# Patient Record
Sex: MALE | Race: WHITE | NOT HISPANIC OR LATINO | Employment: UNEMPLOYED | URBAN - METROPOLITAN AREA
[De-identification: names, ages, dates, MRNs, and addresses within clinical notes are randomized per-mention and may not be internally consistent; named-entity substitution may affect disease eponyms.]

---

## 2022-03-03 NOTE — PROGRESS NOTES
PT Evaluation     Today's date: 3/4/2022  Patient name: Suzanne Bell  : 1965  MRN: [id number]  Referring provider: GOSIA Marte  Dx:   Encounter Diagnosis     ICD-10-CM    1  Cervical radiculopathy  M54 12    2  H/O laminectomy  Z98 890                   Assessment  Assessment details: Chadwick Koehler is a 64year old male presenting to physical therapy with neck pain  Chadwick Koehler shows signs and symptoms suggesting a radiculopathy with high irritability levels  Patient presents with pain, decreased strength, decreased range of motion, postural deficits and decreased tolerance to activity  Due to these impairments patient has difficulty performing activities of daily living, recreational activities and engaging in social activities  Patient would benefit from skilled physical therapy services to address these impairments and to maximize function  Thank you for the referral    Impairments: abnormal muscle firing, abnormal muscle tone, abnormal or restricted ROM, abnormal movement, impaired physical strength, lacks appropriate home exercise program, pain with function and scapular dyskinesis  Understanding of Dx/Px/POC: excellent  Goals  Impairment Goals:  1 ) Pt will have decreased sx at rest by 50% in 2-4 weeks  2 ) Pt will have improved cervical active range of motion by 15 degrees in sidebend and rotation to the left respectively without pain in 4-6 weeks  3 ) Pt will have decreased tenderness to palpation to left upper trapezius by 50% in 3-4 weeks  4 ) Pt will improved left shoulder strength and  strength by 1/2 MMT and 10 lbs without pain in 4-6 weeks  Functional Goals:  1 ) Pt will be independent in their home exercise program in 1 week  2 ) Pt will be able to turn head in car to see blind spot in 4-6 weeks  3 ) Pt will be able to complete all ADL's without neck pain in 6-8 weeks  4 ) Pt will have an improved FOTO score of 75/100 in 6-8 weeks        Plan  Plan details: Chadwick Koehler was also encouraged to talk to high PCP later today about his recent weight changes as well as his left sided rib cage pain  Patient would benefit from: skilled PT  Planned therapy interventions: joint mobilization, manual therapy, neuromuscular re-education, patient education, postural training, strengthening, therapeutic exercise, home exercise program, graded activity and graded exercise  Frequency: 2x week  Duration in weeks: 8  Plan of Care beginning date: 3/4/2022  Plan of Care expiration date: 4/29/2022  Treatment plan discussed with: patient        Subjective Evaluation    History of Present Illness  Mechanism of injury: Jennifer Payne is a 64year old male presenting to physical therapy with neck pain  He reports a surgical hx consisting of C3-6 posterior laminectomy on 7/16/2020 for cervical spondylosis with significant stenosis  He had physical therapy after this procedure but admits it was cut short due to an unrelated right hand injury  Over the last few weeks his left sided neck pain has been worsening  Overall he reports that it is better since surgery but he feels that he it might be coming back since he didn't finish with physical therapy  He feels pain located at left sided base of his neck noted as achy and sore at rest  At times he can get sharp and jolting pain depending on his activity or movement  At its worst he gets burning between shoulder blades and midline at the base of his neck  He does not want to have a second surgery and wishes to control the pain with physical therapy  He explains that his left lesser two fingers have continued to be tingling, this was much more intense prior to surgery  He is left hand dominant but his left arm feels weak  He also comments on a left sided rib cage pain which has been present for a few months  He explains that a lot of his providers have not addressed this issue  He reports that this pain has no precipitating factors and can be random  It is tender to the touch   He denies any specific mechanism which started it  He does note that he is a current smoker with a family hx of color and lung CA  He has COPD  He also mentions he lost 35 pounds in 3 weeks, he is seeing his primary care doctor later today as he is suspecting his hypothyroidism is impacting this weight loss  Aggs: Holding something in left hand, looking up, overhead work, sleeping  Eases: lying supine in bed, ibuprofen, gabanpentin      Pain  Current pain ratin  At best pain ratin  At worst pain rating: 10  Quality: dull ache, sharp and tight  Relieving factors: rest, change in position and medications  Aggravating factors: overhead activity    Treatments  Previous treatment: physical therapy  Patient Goals  Patient goals for therapy: decreased pain, increased motion, increased strength and return to sport/leisure activities          Objective     Concurrent Complaints  Positive for disturbed sleep and headaches (3x/week)  Negative for night pain, dizziness, faints, nausea/motion sickness, trouble swallowing and shortness of breath    Static Posture     Head  Forward  Shoulders  Rounded  Palpation   Left   Tenderness of the cervical paraspinals, latissimus, levator scapulae, lower trapezius, middle trapezius, pectoralis minor, rhomboids, scalenes, sternocleidomastoid, suboccipitals and upper trapezius  Right   Tenderness of the rhomboids, scalenes, sternocleidomastoid, suboccipitals and upper trapezius  Active Range of Motion   Cervical/Thoracic Spine       Cervical    Flexion: Neck active flexion: Stretching and relief    Restriction level: minimal  Extension:  with pain Restriction level: maximal  Left lateral flexion: 15 degrees     with pain  Right lateral flexion: 25 degrees     with pain  Left rotation: 50 degrees with pain  Right rotation: 60 degrees    with pain  Left Shoulder   Flexion: 125 degrees with pain  Abduction: 120 degrees with pain    Additional Active Range of Motion Details  Forward head position needed to be corrected during active range of motion assessment  Educated and treatment provided to correct posture in standing and sitting  Pain reproduced at neck during active range of motion of shoulder      Joint Play     Comments: Did not assess due to discomfort getting into position and pain during palpation    Strength/Myotome Testing   Cervical Spine     Left   Interossei strength (t1): 4    Right   Interossei strength (t1): 4 and 4+    Left Shoulder     Planes of Motion   Flexion: 4 (neck pain)   Abduction: 4 (neck pain)   External rotation at 0°: 4   Internal rotation at 0°: 4     Right Shoulder     Planes of Motion   Flexion: 4+   Abduction: 4+   External rotation at 0°: 4+   Internal rotation at 0°: 4+     Left Elbow   Flexion: 4+  Extension: 4+    Right Elbow   Flexion: 4+  Extension: 4+    Left Wrist/Hand   Wrist extension: 4  Wrist flexion: 4  Radial deviation: 4  Ulnar deviation: 4     (2nd hand position)     Trial 1: 85    Right Wrist/Hand   Wrist extension: 4+  Wrist flexion: 4+  Radial deviation: 4+  Ulnar deviation: 4+     (2nd hand position)     Trial 1: 100    Tests   Cervical   Positive vertical compression and cervical distraction test   Negative VBI  Left   Positive Spurling's Test A  Left Shoulder   Positive ULTT1  Lumbar   Positive vertical compression   Additional Tests Details  No relief noted with supine distraction  Irritability levels remained high during evaluation which likely lead to false positive during special testing    Pain in neck before upper limb tension testing position could be reached  Neuro Exam:     Headaches   Patient reports headaches: Yes (3x/week)                Precautions: COPD, CPAP, Familial Hx CA      Manuals                                                                 Neuro Re-Ed                                                                                                        Ther Ex Seated cervical flexion 10x10''            UT/Lev s 10x10''            Supine chin tuck 10x10''            Shoulder blade squeezes 10x10''                                                                Ther Activity                                       Gait Training                                       Modalities

## 2022-03-04 ENCOUNTER — EVALUATION (OUTPATIENT)
Dept: PHYSICAL THERAPY | Facility: CLINIC | Age: 57
End: 2022-03-04
Payer: COMMERCIAL

## 2022-03-04 DIAGNOSIS — Z98.890 H/O LAMINECTOMY: ICD-10-CM

## 2022-03-04 DIAGNOSIS — M54.12 CERVICAL RADICULOPATHY: Primary | ICD-10-CM

## 2022-03-04 PROCEDURE — 97161 PT EVAL LOW COMPLEX 20 MIN: CPT | Performed by: PHYSICAL THERAPIST

## 2022-03-04 PROCEDURE — 97110 THERAPEUTIC EXERCISES: CPT | Performed by: PHYSICAL THERAPIST

## 2022-03-04 RX ORDER — GABAPENTIN 800 MG/1
800 TABLET ORAL 3 TIMES DAILY
COMMUNITY

## 2022-03-04 RX ORDER — LEVOTHYROXINE SODIUM 0.12 MG/1
125 TABLET ORAL DAILY
COMMUNITY

## 2022-03-04 NOTE — LETTER
2022    Camille Carcamo [facility] [address]    Patient: Waleska Mccrary   YOB: 1965   Date of Visit: 3/4/2022     Encounter Diagnosis     ICD-10-CM    1  Cervical radiculopathy  M54 12    2  H/O laminectomy  Z98 890        Dear Dr Charles Canales:    Thank you for your recent referral of Waleska Mccrary  Please review the attached evaluation summary from Armin's recent visit  Please verify that you agree with the plan of care by signing the attached order  If you have any questions or concerns, please do not hesitate to call  I sincerely appreciate the opportunity to share in the care of one of your patients and hope to have another opportunity to work with you in the near future  Sincerely,    Joselyn Diallo, PT      Referring Provider:      I certify that I have read the below Plan of Care and certify the need for these services furnished under this plan of treatment while under my care  Camille Kil, [address] [place] [id number]  Via Fax: [phone]          PT Evaluation     Today's date: 3/4/2022  Patient name: Waleska Mccrary  : 1965  MRN: [id number]  Referring provider: GOSIA Nieto  Dx:   Encounter Diagnosis     ICD-10-CM    1  Cervical radiculopathy  M54 12    2  H/O laminectomy  Z98 890                   Assessment  Assessment details: David Nava is a 64year old male presenting to physical therapy with neck pain  David Nava shows signs and symptoms suggesting a radiculopathy with high irritability levels  Patient presents with pain, decreased strength, decreased range of motion, postural deficits and decreased tolerance to activity  Due to these impairments patient has difficulty performing activities of daily living, recreational activities and engaging in social activities  Patient would benefit from skilled physical therapy services to address these impairments and to maximize function   Thank you for the referral    Impairments: abnormal muscle firing, abnormal muscle tone, abnormal or restricted ROM, abnormal movement, impaired physical strength, lacks appropriate home exercise program, pain with function and scapular dyskinesis  Understanding of Dx/Px/POC: excellent  Goals  Impairment Goals:  1 ) Pt will have decreased sx at rest by 50% in 2-4 weeks  2 ) Pt will have improved cervical active range of motion by 15 degrees in sidebend and rotation to the left respectively without pain in 4-6 weeks  3 ) Pt will have decreased tenderness to palpation to left upper trapezius by 50% in 3-4 weeks  4 ) Pt will improved left shoulder strength and  strength by 1/2 MMT and 10 lbs without pain in 4-6 weeks  Functional Goals:  1 ) Pt will be independent in their home exercise program in 1 week  2 ) Pt will be able to turn head in car to see blind spot in 4-6 weeks  3 ) Pt will be able to complete all ADL's without neck pain in 6-8 weeks  4 ) Pt will have an improved FOTO score of 75/100 in 6-8 weeks  Plan  Plan details: Tanisha Stewart was also encouraged to talk to high PCP later today about his recent weight changes as well as his left sided rib cage pain  Patient would benefit from: skilled PT  Planned therapy interventions: joint mobilization, manual therapy, neuromuscular re-education, patient education, postural training, strengthening, therapeutic exercise, home exercise program, graded activity and graded exercise  Frequency: 2x week  Duration in weeks: 8  Plan of Care beginning date: 3/4/2022  Plan of Care expiration date: 4/29/2022  Treatment plan discussed with: patient        Subjective Evaluation    History of Present Illness  Mechanism of injury: Tanisha Stewart is a 64year old male presenting to physical therapy with neck pain  He reports a surgical hx consisting of C3-6 posterior laminectomy on 7/16/2020 for cervical spondylosis with significant stenosis   He had physical therapy after this procedure but admits it was cut short due to an unrelated right hand injury  Over the last few weeks his left sided neck pain has been worsening  Overall he reports that it is better since surgery but he feels that he it might be coming back since he didn't finish with physical therapy  He feels pain located at left sided base of his neck noted as achy and sore at rest  At times he can get sharp and jolting pain depending on his activity or movement  At its worst he gets burning between shoulder blades and midline at the base of his neck  He does not want to have a second surgery and wishes to control the pain with physical therapy  He explains that his left lesser two fingers have continued to be tingling, this was much more intense prior to surgery  He is left hand dominant but his left arm feels weak  He also comments on a left sided rib cage pain which has been present for a few months  He explains that a lot of his providers have not addressed this issue  He reports that this pain has no precipitating factors and can be random  It is tender to the touch  He denies any specific mechanism which started it  He does note that he is a current smoker with a family hx of color and lung CA  He has COPD  He also mentions he lost 35 pounds in 3 weeks, he is seeing his primary care doctor later today as he is suspecting his hypothyroidism is impacting this weight loss      Aggs: Holding something in left hand, looking up, overhead work, sleeping  Eases: lying supine in bed, ibuprofen, gabanpentin      Pain  Current pain ratin  At best pain ratin  At worst pain rating: 10  Quality: dull ache, sharp and tight  Relieving factors: rest, change in position and medications  Aggravating factors: overhead activity    Treatments  Previous treatment: physical therapy  Patient Goals  Patient goals for therapy: decreased pain, increased motion, increased strength and return to sport/leisure activities          Objective Concurrent Complaints  Positive for disturbed sleep and headaches (3x/week)  Negative for night pain, dizziness, faints, nausea/motion sickness, trouble swallowing and shortness of breath    Static Posture     Head  Forward  Shoulders  Rounded  Palpation   Left   Tenderness of the cervical paraspinals, latissimus, levator scapulae, lower trapezius, middle trapezius, pectoralis minor, rhomboids, scalenes, sternocleidomastoid, suboccipitals and upper trapezius  Right   Tenderness of the rhomboids, scalenes, sternocleidomastoid, suboccipitals and upper trapezius  Active Range of Motion   Cervical/Thoracic Spine       Cervical    Flexion: Neck active flexion: Stretching and relief  Restriction level: minimal  Extension:  with pain Restriction level: maximal  Left lateral flexion: 15 degrees     with pain  Right lateral flexion: 25 degrees     with pain  Left rotation: 50 degrees with pain  Right rotation: 60 degrees    with pain  Left Shoulder   Flexion: 125 degrees with pain  Abduction: 120 degrees with pain    Additional Active Range of Motion Details  Forward head position needed to be corrected during active range of motion assessment  Educated and treatment provided to correct posture in standing and sitting    Pain reproduced at neck during active range of motion of shoulder      Joint Play     Comments: Did not assess due to discomfort getting into position and pain during palpation    Strength/Myotome Testing   Cervical Spine     Left   Interossei strength (t1): 4    Right   Interossei strength (t1): 4 and 4+    Left Shoulder     Planes of Motion   Flexion: 4 (neck pain)   Abduction: 4 (neck pain)   External rotation at 0°: 4   Internal rotation at 0°: 4     Right Shoulder     Planes of Motion   Flexion: 4+   Abduction: 4+   External rotation at 0°: 4+   Internal rotation at 0°: 4+     Left Elbow   Flexion: 4+  Extension: 4+    Right Elbow   Flexion: 4+  Extension: 4+    Left Wrist/Hand Wrist extension: 4  Wrist flexion: 4  Radial deviation: 4  Ulnar deviation: 4     (2nd hand position)     Trial 1: 85    Right Wrist/Hand   Wrist extension: 4+  Wrist flexion: 4+  Radial deviation: 4+  Ulnar deviation: 4+     (2nd hand position)     Trial 1: 100    Tests   Cervical   Positive vertical compression and cervical distraction test   Negative VBI  Left   Positive Spurling's Test A  Left Shoulder   Positive ULTT1  Lumbar   Positive vertical compression   Additional Tests Details  No relief noted with supine distraction  Irritability levels remained high during evaluation which likely lead to false positive during special testing    Pain in neck before upper limb tension testing position could be reached  Neuro Exam:     Headaches   Patient reports headaches: Yes (3x/week)                Precautions: COPD, CPAP, Familial Hx CA      Manuals                                                                 Neuro Re-Ed                                                                                                        Ther Ex             Seated cervical flexion 10x10''            UT/Lev s 10x10''            Supine chin tuck 10x10''            Shoulder blade squeezes 10x10''                                                                Ther Activity                                       Gait Training                                       Modalities

## 2022-03-10 ENCOUNTER — OFFICE VISIT (OUTPATIENT)
Dept: PHYSICAL THERAPY | Facility: CLINIC | Age: 57
End: 2022-03-10
Payer: COMMERCIAL

## 2022-03-10 DIAGNOSIS — M54.12 CERVICAL RADICULOPATHY: Primary | ICD-10-CM

## 2022-03-10 DIAGNOSIS — M25.551 RIGHT HIP PAIN: ICD-10-CM

## 2022-03-10 PROCEDURE — 97110 THERAPEUTIC EXERCISES: CPT | Performed by: PHYSICAL THERAPIST

## 2022-03-10 PROCEDURE — 97140 MANUAL THERAPY 1/> REGIONS: CPT | Performed by: PHYSICAL THERAPIST

## 2022-03-10 NOTE — PROGRESS NOTES
Daily Note     Today's date: 3/10/2022  Patient name: Tamir Basurto  : 1965  MRN: [id number]  Referring provider: GOSIA Aguayo  Dx:   Encounter Diagnosis     ICD-10-CM    1  Cervical radiculopathy  M54 12    2  Right hip pain  M25 551                   Subjective: [place] explains that his neck has been feeling a little better  He has some questions about his seated shoulder blade squeezes and his chin tucks  Objective: See treatment diary below      Assessment: Tolerated treatment well  Patient demonstrated fatigue post treatment  Cueing to correct form during chin tuck and seated shoulder blade squeezes  He is only to do chin tucks in supine at this time  Continue manuals and cervical distraction nv as this provided relief  Plan: Continue per plan of care        Precautions: COPD, CPAP, Familial Hx CA      Manuals  3/10           SOR  RN           TPR L UT  RN           Supine cervical distraction  RN           ULTT  nv           Neuro Re-Ed                                                                                                        Ther Ex             Seated cervical flexion 10x10'' 10x10''           UT/Lev s 10x10'' 10x10''           Supine chin tuck 10x10'' 10x5''           Shoulder blade squeezes 10x10'' 10x10''           Supine cervical rotation  10x5'' ea                                                  Ther Activity                                       Gait Training                                       Modalities

## 2022-03-14 ENCOUNTER — OFFICE VISIT (OUTPATIENT)
Dept: PHYSICAL THERAPY | Facility: CLINIC | Age: 57
End: 2022-03-14
Payer: COMMERCIAL

## 2022-03-14 DIAGNOSIS — M25.551 RIGHT HIP PAIN: ICD-10-CM

## 2022-03-14 DIAGNOSIS — M54.12 CERVICAL RADICULOPATHY: Primary | ICD-10-CM

## 2022-03-14 DIAGNOSIS — Z98.890 H/O LAMINECTOMY: ICD-10-CM

## 2022-03-14 PROCEDURE — 97140 MANUAL THERAPY 1/> REGIONS: CPT | Performed by: PHYSICAL THERAPIST

## 2022-03-14 NOTE — PROGRESS NOTES
Daily Note     Today's date: 3/14/2022  Patient name: Charley Smith  : 1965  MRN: [id number]  Referring provider: GOSIA Bhatia  Dx:   Encounter Diagnosis     ICD-10-CM    1  Cervical radiculopathy  M54 12    2  Right hip pain  M25 551    3  H/O laminectomy  Z98 890                   Subjective: Edin Nails describes an episode yesterday morning when he was sitting in his car, leaning his right ear towards his right shoulder  He was watching videos on his phone and went into a period where he could only hear the video, feeling stuck in position and he found himself drooling  He is unsure of how long he was in this position, feels "off" since that point  He denies chest pain, slurred speech, nausea, dizziness  He denies weakness today  Objective: See treatment diary below      Assessment: Tolerated treatment well  Patient demonstrated fatigue post treatment and exhibited good technique with therapeutic exercises  Edin Nails was encouraged to follow up with his PCP or neurologist given his recent symptoms  Held on progressing TE at this time, also encouraged him to avoid irritating his sx and keep them to a minimum until we clear his symptoms up with his PCP  Plan: Continue per plan of care        Precautions: COPD, CPAP, Familial Hx CA      Manuals  3/10 3/14          SOR  RN FERNANDO          TPR L UT  RN RN          Supine cervical distraction  FERNANDO barber           Neuro Re-Ed                                                                                                        Ther Ex             Seated cervical flexion 10x10'' 10x10'' 10x10''          UT/Lev s 10x10'' 10x10''           Supine chin tuck 10x10'' 10x5''           Shoulder blade squeezes 10x10'' 10x10''           Supine cervical rotation  10x5'' ea                                                  Ther Activity                                       Gait Training                                       Modalities

## 2022-03-17 ENCOUNTER — APPOINTMENT (OUTPATIENT)
Dept: PHYSICAL THERAPY | Facility: CLINIC | Age: 57
End: 2022-03-17
Payer: COMMERCIAL

## 2022-03-21 ENCOUNTER — OFFICE VISIT (OUTPATIENT)
Dept: PHYSICAL THERAPY | Facility: CLINIC | Age: 57
End: 2022-03-21
Payer: COMMERCIAL

## 2022-03-21 DIAGNOSIS — M54.12 CERVICAL RADICULOPATHY: Primary | ICD-10-CM

## 2022-03-21 DIAGNOSIS — M25.551 RIGHT HIP PAIN: ICD-10-CM

## 2022-03-21 DIAGNOSIS — Z98.890 H/O LAMINECTOMY: ICD-10-CM

## 2022-03-21 PROCEDURE — 97140 MANUAL THERAPY 1/> REGIONS: CPT | Performed by: PHYSICAL THERAPIST

## 2022-03-21 NOTE — PROGRESS NOTES
Daily Note     Today's date: 3/21/2022  Patient name: Radha Conte  : 1965  MRN: [id number]  Referring provider: GOSIA Jorgensen  Dx:   Encounter Diagnosis     ICD-10-CM    1  Cervical radiculopathy  M54 12    2  Right hip pain  M25 551    3  H/O laminectomy  Z98 890                   Subjective: [facility] explains that neck continues to bother him, he is going to see his neurologist on Wednesday and he is convinced he will need another surgery  Objective: See treatment diary below      Assessment: Tolerated treatment well  Patient demonstrated fatigue post treatment  Alex notes relief with seated cervical flexion and right side bend, opening up on the left  He was told to stop completing the chin tucks and other irritating motions all together  Soft tissue mobilization to left upper trapezius provided today, did not do any TE due to symptoms irritability and changes  Plan: Continue per plan of care        Precautions: COPD, CPAP, Familial Hx CA      Manuals  3/10 3/14 3/21         SOR  RN RN          TPR L UT  RN RN RN         Supine cervical distraction  RN FERNANDO barber           Neuro Re-Ed                                                                                                        Ther Ex             Seated cervical flexion 10x10'' 10x10'' 10x10''          UT/Lev s 10x10'' 10x10''           Supine chin tuck 10x10'' 10x5''           Shoulder blade squeezes 10x10'' 10x10''           Supine cervical rotation  10x5'' ea                                                  Ther Activity                                       Gait Training                                       Modalities

## 2022-03-24 ENCOUNTER — APPOINTMENT (OUTPATIENT)
Dept: PHYSICAL THERAPY | Facility: CLINIC | Age: 57
End: 2022-03-24
Payer: COMMERCIAL

## 2022-03-28 ENCOUNTER — OFFICE VISIT (OUTPATIENT)
Dept: PHYSICAL THERAPY | Facility: CLINIC | Age: 57
End: 2022-03-28
Payer: COMMERCIAL

## 2022-03-28 DIAGNOSIS — Z98.890 H/O LAMINECTOMY: ICD-10-CM

## 2022-03-28 DIAGNOSIS — M25.551 RIGHT HIP PAIN: ICD-10-CM

## 2022-03-28 DIAGNOSIS — M54.12 CERVICAL RADICULOPATHY: Primary | ICD-10-CM

## 2022-03-28 PROCEDURE — 97140 MANUAL THERAPY 1/> REGIONS: CPT | Performed by: PHYSICAL THERAPIST

## 2022-03-31 ENCOUNTER — APPOINTMENT (OUTPATIENT)
Dept: PHYSICAL THERAPY | Facility: CLINIC | Age: 57
End: 2022-03-31
Payer: COMMERCIAL

## 2022-04-04 ENCOUNTER — OFFICE VISIT (OUTPATIENT)
Dept: PHYSICAL THERAPY | Facility: CLINIC | Age: 57
End: 2022-04-04
Payer: COMMERCIAL

## 2022-04-04 DIAGNOSIS — M54.12 CERVICAL RADICULOPATHY: Primary | ICD-10-CM

## 2022-04-04 DIAGNOSIS — M25.551 RIGHT HIP PAIN: ICD-10-CM

## 2022-04-04 DIAGNOSIS — Z98.890 H/O LAMINECTOMY: ICD-10-CM

## 2022-04-04 PROCEDURE — 97140 MANUAL THERAPY 1/> REGIONS: CPT | Performed by: PHYSICAL THERAPIST

## 2022-04-04 NOTE — PROGRESS NOTES
Daily Note     Today's date: 2022  Patient name: Sindy Cantrell  : 1965  MRN: [id number]  Referring provider: GOSIA Weiss  Dx:   Encounter Diagnosis     ICD-10-CM    1  Cervical radiculopathy  M54 12    2  Right hip pain  M25 551    3  H/O laminectomy  Z98 890                   Subjective: Stoney Gibson explains that he has had a left sided neck pain and a headache behind his eye for 3 days  Objective: See treatment diary below      Assessment: Tolerated treatment well  Patient demonstrated fatigue post treatment  After completion of manuals, headache and eye pain reduced to 0/10  Encouraged Alex to lay in a non weight bearing position to give his neck a rest during the times where his headache is uncontrolled  Plan: Continue per plan of care        Precautions: COPD, CPAP, Familial Hx CA      Manuals  3/10 3/14 3/21 4/        SOR  RN RN RN RN        TPR L UT  RN RN RN RN        Supine cervical distraction  RN RN  RN        DANNIE barber           Neuro Re-Ed                                                                                                        Ther Ex             Seated cervical flexion 10x10'' 10x10'' 10x10'' 10x10'' 10x10''        UT/Lev s 10x10'' 10x10''  UT only 10x10'' UT only 10x10''        Supine chin tuck 10x10'' 10x5''           Shoulder blade squeezes 10x10'' 10x10''           Supine cervical rotation  10x5'' ea                                                  Ther Activity                                       Gait Training                                       Modalities

## 2022-04-07 ENCOUNTER — OFFICE VISIT (OUTPATIENT)
Dept: PHYSICAL THERAPY | Facility: CLINIC | Age: 57
End: 2022-04-07
Payer: COMMERCIAL

## 2022-04-07 DIAGNOSIS — M54.12 CERVICAL RADICULOPATHY: Primary | ICD-10-CM

## 2022-04-07 DIAGNOSIS — M25.551 RIGHT HIP PAIN: ICD-10-CM

## 2022-04-07 DIAGNOSIS — Z98.890 H/O LAMINECTOMY: ICD-10-CM

## 2022-04-07 PROCEDURE — 97140 MANUAL THERAPY 1/> REGIONS: CPT | Performed by: PHYSICAL THERAPIST

## 2022-04-07 PROCEDURE — 97110 THERAPEUTIC EXERCISES: CPT | Performed by: PHYSICAL THERAPIST

## 2022-04-07 NOTE — PROGRESS NOTES
Daily Note     Today's date: 2022  Patient name: Yasmine Desir  : 1965  MRN: [id number]  Referring provider: Marylee Krauss, CRNP  Dx:   Encounter Diagnosis     ICD-10-CM    1  Cervical radiculopathy  M54 12    2  Right hip pain  M25 551    3  H/O laminectomy  Z98 890                   Subjective: Rajeev Whitfield explains that for some reason he is having a great day, he is unsure of why  Objective: See treatment diary below      Assessment: Tolerated treatment well  Patient demonstrated fatigue post treatment and exhibited good technique with therapeutic exercises  Improved soft tissue mobility to cervical spine, slightly improved range of motion during active stretches  Plan: Continue per plan of care        Precautions: COPD, CPAP, Familial Hx CA      Manuals  3/10 3/14 3/21 4/4 4/7       SOR  RN RN RN RN RN       TPR L UT  RN RN RN RN RN       Supine cervical distraction  RN RN  RN RN       DANNIE barber           Neuro Re-Ed                                                                                                        Ther Ex             Seated cervical flexion 10x10'' 10x10'' 10x10'' 10x10'' 10x10'' 10x10''       UT/Lev s 10x10'' 10x10''  UT only 10x10'' UT only 10x10'' UT only 10x10''       Supine chin tuck 10x10'' 10x5''           Shoulder blade squeezes 10x10'' 10x10''           Supine cervical rotation  10x5'' ea                                                  Ther Activity                                       Gait Training                                       Modalities

## 2022-04-12 ENCOUNTER — OFFICE VISIT (OUTPATIENT)
Dept: PHYSICAL THERAPY | Facility: CLINIC | Age: 57
End: 2022-04-12
Payer: COMMERCIAL

## 2022-04-12 DIAGNOSIS — M25.551 RIGHT HIP PAIN: ICD-10-CM

## 2022-04-12 DIAGNOSIS — M54.12 CERVICAL RADICULOPATHY: Primary | ICD-10-CM

## 2022-04-12 DIAGNOSIS — Z98.890 H/O LAMINECTOMY: ICD-10-CM

## 2022-04-12 PROCEDURE — 97110 THERAPEUTIC EXERCISES: CPT | Performed by: PHYSICAL THERAPIST

## 2022-04-12 PROCEDURE — 97140 MANUAL THERAPY 1/> REGIONS: CPT | Performed by: PHYSICAL THERAPIST

## 2022-04-12 NOTE — PROGRESS NOTES
Daily Note     Today's date: 2022  Patient name: Kasandra Fong  : 1965  MRN: [id number]  Referring provider: GOSIA Hanley  Dx:   Encounter Diagnosis     ICD-10-CM    1  Cervical radiculopathy  M54 12    2  Right hip pain  M25 551    3  H/O laminectomy  Z98 890                   Subjective: Theodore Del Castillo explains that yesterday he felt great, this morning he has a left sided frontal headache  He thinks he may have slept the wrong way  Objective: See treatment diary below      Assessment: Tolerated treatment well  Patient demonstrated fatigue post treatment and exhibited good technique with therapeutic exercises  Subjective relief noted with muscular tension and headaches this morning post sub occipital release and soft tissue mobilizations  Plan: Continue per plan of care        Precautions: COPD, CPAP, Familial Hx CA      Manuals  3/10 3/14 3/21 4/4 4/7 4/12      SOR  RN RN RN RN RN RN      TPR L UT  RN RN RN RN RN RN      Supine cervical distraction  RN RN  RN RN RN      DANNIE barber           Neuro Re-Ed                                                                                                        Ther Ex             Seated cervical flexion 10x10'' 10x10'' 10x10'' 10x10'' 10x10'' 10x10'' 10x10''      UT/Lev s 10x10'' 10x10''  UT only 10x10'' UT only 10x10'' UT only 10x10'' UT only 10x10''      Supine chin tuck 10x10'' 10x5''           Shoulder blade squeezes 10x10'' 10x10''           Supine cervical rotation  10x5'' ea                                                  Ther Activity                                       Gait Training                                       Modalities

## 2022-04-19 ENCOUNTER — HOSPITAL ENCOUNTER (EMERGENCY)
Facility: HOSPITAL | Age: 57
Discharge: HOME | End: 2022-04-19
Attending: EMERGENCY MEDICINE | Admitting: EMERGENCY MEDICINE
Payer: COMMERCIAL

## 2022-04-19 VITALS
WEIGHT: 277 LBS | SYSTOLIC BLOOD PRESSURE: 129 MMHG | RESPIRATION RATE: 18 BRPM | HEART RATE: 77 BPM | TEMPERATURE: 98.7 F | HEIGHT: 74 IN | DIASTOLIC BLOOD PRESSURE: 87 MMHG | BODY MASS INDEX: 35.55 KG/M2 | OXYGEN SATURATION: 96 %

## 2022-04-19 DIAGNOSIS — R51.9 RECURRENT OCCIPITAL HEADACHE: ICD-10-CM

## 2022-04-19 DIAGNOSIS — M54.12 CERVICAL RADICULOPATHY: Primary | ICD-10-CM

## 2022-04-19 DIAGNOSIS — S16.1XXA STRAIN OF CERVICAL PORTION OF LEFT TRAPEZIUS MUSCLE: ICD-10-CM

## 2022-04-19 PROCEDURE — 64405 NJX AA&/STRD GR OCPL NRV: CPT | Performed by: EMERGENCY MEDICINE

## 2022-04-19 PROCEDURE — 99283 EMERGENCY DEPT VISIT LOW MDM: CPT

## 2022-04-19 PROCEDURE — 99284 EMERGENCY DEPT VISIT MOD MDM: CPT | Performed by: EMERGENCY MEDICINE

## 2022-04-19 RX ORDER — METHOCARBAMOL 500 MG/1
1000 TABLET, FILM COATED ORAL 3 TIMES DAILY PRN
Qty: 40 TABLET | Refills: 0 | Status: SHIPPED | OUTPATIENT
Start: 2022-04-19

## 2022-04-19 RX ORDER — METHOCARBAMOL 500 MG/1
500 TABLET, FILM COATED ORAL ONCE
Status: COMPLETED | OUTPATIENT
Start: 2022-04-19 | End: 2022-04-19

## 2022-04-19 RX ORDER — LIDOCAINE HYDROCHLORIDE 20 MG/ML
5 INJECTION, SOLUTION EPIDURAL; INFILTRATION; INTRACAUDAL; PERINEURAL ONCE
Status: COMPLETED | OUTPATIENT
Start: 2022-04-19 | End: 2022-04-19

## 2022-04-19 RX ADMIN — METHOCARBAMOL 500 MG: 500 TABLET ORAL at 20:13

## 2022-04-19 RX ADMIN — LIDOCAINE HYDROCHLORIDE 5 ML: 20 INJECTION, SOLUTION EPIDURAL; INFILTRATION; INTRACAUDAL; PERINEURAL at 20:13

## 2022-04-19 NOTE — ED PROVIDER NOTES
History  Chief Complaint   Patient presents with    Neck Pain     Patient presents to the ER with head and neck pain  Patient had spinal surgery ~ 2 years ago and has been having "strange symptoms" since  59-year-old male presents for evaluation of left-sided neck pain with intermittent radiating sharp pain from the occipital region of the left side of his head to his left temple  The patient states that the symptoms are usually exacerbated with straining or movement of his neck exacerbating symptoms usually last a 1/2 hour to several hours  He has occasional radiating pain into his left hand  The patient has been seen by Neurology and is currently undergoing physical therapy for similar symptoms  He is takin gabapentin TID  cymbalta QHs  Plus one other medication he cannot recall  Patient denies taking muscle relaxer  The patient denies any recent trauma or illness            Prior to Admission Medications   Prescriptions Last Dose Informant Patient Reported? Taking?   gabapentin (NEURONTIN) 800 mg tablet 2022 at Unknown time  Yes Yes   Sig: Take 800 mg by mouth 3 (three) times a day   levothyroxine 125 mcg tablet 2022 at Unknown time  Yes Yes   Sig: Take 125 mcg by mouth daily      Facility-Administered Medications: None       Past Medical History:   Diagnosis Date    H/O laminectomy     Posterior laminectomy C3-6    Hypothyroidism        Past Surgical History:   Procedure Laterality Date    ELBOW SURGERY Right     HAND TENDON SURGERY         History reviewed  No pertinent family history  I have reviewed and agree with the history as documented      E-Cigarette/Vaping    E-Cigarette Use Never User      E-Cigarette/Vaping Substances     Social History     Tobacco Use    Smoking status: Current Every Day Smoker     Packs/day: 1 00    Smokeless tobacco: Never Used   Vaping Use    Vaping Use: Never used   Substance Use Topics    Alcohol use: Not Currently    Drug use: Never Review of Systems   Constitutional: Negative for chills and fever  Gastrointestinal: Positive for nausea  Musculoskeletal: Positive for neck pain and neck stiffness  Neurological: Positive for dizziness and headaches  All other systems reviewed and are negative  Physical Exam  Physical Exam  Vitals and nursing note reviewed  Constitutional:       General: He is not in acute distress  Appearance: He is well-developed  HENT:      Head: Normocephalic and atraumatic  Right Ear: External ear normal       Left Ear: External ear normal       Mouth/Throat:      Pharynx: No oropharyngeal exudate  Eyes:      General: No scleral icterus  Pupils: Pupils are equal, round, and reactive to light  Cardiovascular:      Rate and Rhythm: Normal rate and regular rhythm  Heart sounds: Normal heart sounds  Pulmonary:      Effort: Pulmonary effort is normal  No respiratory distress  Breath sounds: Normal breath sounds  Abdominal:      General: Bowel sounds are normal       Palpations: Abdomen is soft  Tenderness: There is no abdominal tenderness  There is no guarding or rebound  Musculoskeletal:      Cervical back: Neck supple  No rigidity or torticollis  Pain with movement and muscular tenderness present  No spinous process tenderness  Decreased range of motion  Lymphadenopathy:      Cervical:      Right cervical: No posterior cervical adenopathy  Left cervical: No posterior cervical adenopathy  Skin:     General: Skin is warm and dry  Findings: No rash  Neurological:      Mental Status: He is alert and oriented to person, place, and time           Vital Signs  ED Triage Vitals [04/19/22 1710]   Temperature Pulse Respirations Blood Pressure SpO2   98 7 °F (37 1 °C) 89 18 128/77 97 %      Temp Source Heart Rate Source Patient Position - Orthostatic VS BP Location FiO2 (%)   Oral Monitor Sitting Left arm --      Pain Score       8           Vitals:    04/19/22 1710 04/19/22 2016   BP: 128/77 129/87   Pulse: 89 77   Patient Position - Orthostatic VS: Sitting Sitting         Visual Acuity      ED Medications  Medications   lidocaine (PF) (XYLOCAINE-MPF) 2 % injection 5 mL (5 mL Infiltration Given by Other 4/19/22 2013)   methocarbamol (ROBAXIN) tablet 500 mg (500 mg Oral Given 4/19/22 2013)       Diagnostic Studies  Results Reviewed     None                 No orders to display              Procedures  Nerve block    Date/Time: 4/19/2022 8:37 PM  Performed by: Marilee Bryan DO  Authorized by: Marilee Bryan DO     Patient location:  ED  [place] Protocol:  Consent: Verbal consent obtained  Risks and benefits: risks, benefits and alternatives were discussed    Indications:     Indications:  Pain relief  Location:     Body area:  Head    Head nerve:  Greater occipital    Laterality:  Left  Pre-procedure details:     Skin preparation:  2% chlorhexidine  Procedure details (see MAR for exact dosages): Block needle gauge:  27 G    Anesthetic injected:  Lidocaine 2% w/o epi    Injection procedure:  Anatomic landmarks identified, anatomic landmarks palpated, incremental injection, introduced needle and negative aspiration for blood  Post-procedure details:     Dressing:  None    Outcome:  Pain relieved    Patient tolerance of procedure: Tolerated well, no immediate complications             ED Course                               SBIRT 22yo+      Most Recent Value   SBIRT (22 yo +)    In order to provide better care to our patients, we are screening all of our patients for alcohol and drug use  Would it be okay to ask you these screening questions? Yes Filed at: 04/19/2022 2015   Initial Alcohol Screen: US AUDIT-C     1  How often do you have a drink containing alcohol? 0 Filed at: 04/19/2022 2015   2  How many drinks containing alcohol do you have on a typical day you are drinking? 0 Filed at: 04/19/2022 2015   3a  Male UNDER 65:  How often do you have five or more drinks on one occasion? 0 Filed at: 04/19/2022 2015   Audit-C Score 0 Filed at: 04/19/2022 2015   IZAIAH: How many times in the past year have you    Used an illegal drug or used a prescription medication for non-medical reasons? Never Filed at: 04/19/2022 2015                    [facility]  Number of Diagnoses or Management Options  Cervical radiculopathy  Recurrent occipital headache  Strain of cervical portion of left trapezius muscle  Diagnosis management comments: Patient with history and examination consistent with occipital headache and left cervical radiculopathy  Patient's symptoms are reproducible with movement and certain positions  0 the patient's left-sided headache originates from his occipital ridge to the left retro-orbital region  I discussed the use of an occipital block for pain control  We discussed the cervical spasm in the use of a peripheral acting skeletal muscle relaxer  The patient was advised to continue with physical therapy and outpatient neurology  The patient had relief of pain after the nerve block  The patient also had improved movement of his cervical spine with decreased pain as well prior to discharge  The patient (and any family present) verbalized understanding of the discharge instructions and warnings that would necessitate return to the Emergency Department  All questions were answered prior to discharge         Amount and/or Complexity of Data Reviewed  Review and summarize past medical records: yes (Physical therapy notes reviewed with recent with no cervical radiculopathy treatment)        Disposition  Final diagnoses:   Cervical radiculopathy   Recurrent occipital headache   Strain of cervical portion of left trapezius muscle     Time reflects when diagnosis was documented in both MDM as applicable and the Disposition within this note     Time User Action Codes Description Comment    4/19/2022  8:35 PM Floyd Victoria Add [M54 12] Cervical radiculopathy     4/19/2022  8:36 PM Samina ARIAS Add [R51 9] Recurrent occipital headache     4/19/2022  8:36 PM Bernie Ocasio Add Olvin Brisk  1XXA] Strain of cervical portion of left trapezius muscle       ED Disposition     ED Disposition Condition Date/Time Comment    Discharge Stable Tue Apr 19, 2022  8:35 PM Sindy Pollen discharge to home/self care  Follow-up Information     Follow up With Specialties Details Why Contact Info    Eliot Whitley MD [facility] Medicine Schedule an appointment as soon as possible for a visit  For further evaluation, If symptoms worsen [facility] [address] [place] [address]  [phone]            Patient's Medications   Discharge Prescriptions    METHOCARBAMOL (ROBAXIN) 500 MG TABLET    Take 2 tablets (1,000 mg total) by mouth 3 (three) times a day as needed for muscle spasms       Start Date: 4/19/2022 End Date: --       Order Dose: 1,000 mg       Quantity: 40 tablet    Refills: 0       No discharge procedures on file      PDMP Review       Value Time User    PDMP Reviewed  Yes 4/19/2022  7:58 PM Gavino Richards DO          ED Provider  Electronically Signed by           Gavino Richards DO  04/19/22 2044

## 2022-04-20 ENCOUNTER — OFFICE VISIT (OUTPATIENT)
Dept: PHYSICAL THERAPY | Facility: CLINIC | Age: 57
End: 2022-04-20
Payer: COMMERCIAL

## 2022-04-20 DIAGNOSIS — Z98.890 H/O LAMINECTOMY: ICD-10-CM

## 2022-04-20 DIAGNOSIS — M54.12 CERVICAL RADICULOPATHY: Primary | ICD-10-CM

## 2022-04-20 DIAGNOSIS — M25.551 RIGHT HIP PAIN: ICD-10-CM

## 2022-04-20 PROCEDURE — 97140 MANUAL THERAPY 1/> REGIONS: CPT | Performed by: PHYSICAL THERAPIST

## 2022-04-20 NOTE — PROGRESS NOTES
Daily Note     Today's date: 2022  Patient name: Winston Mckinley  : 1965  MRN: [id number]  Referring provider: GOSIA Stark  Dx:   Encounter Diagnosis     ICD-10-CM    1  Cervical radiculopathy  M54 12    2  Right hip pain  M25 551    3  H/O laminectomy  Z98 890                   Subjective: Betty Moore explains that his symptoms have not improved much at this point  He went to the ER last night due to sharp and radiating pain into the back of his head  He has a shot of lidocaine which provided temporary relief  Objective: See treatment diary below      Assessment: Tolerated treatment fair  Patient demonstrated fatigue post treatment  Sharp and increased pain when providing trigger point release to right upper trapezius, held on this  Sub-occipital release provided with relief and improvement in sx noted  Plan: Continue per plan of care        Precautions: COPD, CPAP, Familial Hx CA      Manuals  3/10 3/14 3/21 4/4 4/7 4/12 4/20     SOR  RN RN RN RN RN RN RN     TPR L UT  RN RN RN RN RN RN RN     Supine cervical distraction  RN RN  RN RN RN RN     DANNIE barber           Neuro Re-Ed                                                                                                        Ther Ex             Seated cervical flexion 10x10'' 10x10'' 10x10'' 10x10'' 10x10'' 10x10'' 10x10'' 10x10''     UT/Lev s 10x10'' 10x10''  UT only 10x10'' UT only 10x10'' UT only 10x10'' UT only 10x10'' UT only 10x10''     Supine chin tuck 10x10'' 10x5''           Shoulder blade squeezes 10x10'' 10x10''           Supine cervical rotation  10x5'' ea                                                  Ther Activity                                       Gait Training                                       Modalities

## 2022-04-25 ENCOUNTER — OFFICE VISIT (OUTPATIENT)
Dept: PHYSICAL THERAPY | Facility: CLINIC | Age: 57
End: 2022-04-25
Payer: COMMERCIAL

## 2022-04-25 DIAGNOSIS — Z98.890 H/O LAMINECTOMY: ICD-10-CM

## 2022-04-25 DIAGNOSIS — M25.551 RIGHT HIP PAIN: ICD-10-CM

## 2022-04-25 DIAGNOSIS — M54.12 CERVICAL RADICULOPATHY: Primary | ICD-10-CM

## 2022-04-25 PROCEDURE — 97140 MANUAL THERAPY 1/> REGIONS: CPT | Performed by: PHYSICAL THERAPIST

## 2022-04-25 NOTE — PROGRESS NOTES
Daily Note     Today's date: 2022  Patient name: Jack Rain  : 1965  MRN: [id number]  Referring provider: GOSIA Dela Cruz  Dx:   Encounter Diagnosis     ICD-10-CM    1  Cervical radiculopathy  M54 12    2  Right hip pain  M25 551    3  H/O laminectomy  Z98 890                   Subjective: Conrad Lewis reports that his neurologist increased his medications for night time  He explains that his head pains are still painful  Objective: See treatment diary below      Assessment: Tolerated treatment well  Patient demonstrated fatigue post treatment and exhibited good technique with therapeutic exercises  Continued emphasis on manuals this session with limitations in suboccipitals and restrictions in scalenes  Plan: Continue per plan of care        Precautions: COPD, CPAP, Familial Hx CA      Manuals  3/10 3/14 3/21 4/4 4/7 4/12 4/20 4/25    SOR  RN RN RN RN RN RN RN RN    TPR L UT  RN RN RN RN RN RN RN RN    Supine cervical distraction  RN RN  RN RN RN RN RN    DANNIE barber           Neuro Re-Ed                                                                                                        Ther Ex             Seated cervical flexion 10x10'' 10x10'' 10x10'' 10x10'' 10x10'' 10x10'' 10x10'' 10x10'' 10x10''    UT/Lev s 10x10'' 10x10''  UT only 10x10'' UT only 10x10'' UT only 10x10'' UT only 10x10'' UT only 10x10'' UT only 10x10''    Supine chin tuck 10x10'' 10x5''           Shoulder blade squeezes 10x10'' 10x10''           Supine cervical rotation  10x5'' ea                                                  Ther Activity                                       Gait Training                                       Modalities

## 2022-04-29 ENCOUNTER — APPOINTMENT (OUTPATIENT)
Dept: PHYSICAL THERAPY | Facility: CLINIC | Age: 57
End: 2022-04-29
Payer: COMMERCIAL

## 2022-05-04 ENCOUNTER — OFFICE VISIT (OUTPATIENT)
Dept: PHYSICAL THERAPY | Facility: CLINIC | Age: 57
End: 2022-05-04
Payer: COMMERCIAL

## 2022-05-04 DIAGNOSIS — Z98.890 H/O LAMINECTOMY: ICD-10-CM

## 2022-05-04 DIAGNOSIS — M54.12 CERVICAL RADICULOPATHY: Primary | ICD-10-CM

## 2022-05-04 DIAGNOSIS — M25.551 RIGHT HIP PAIN: ICD-10-CM

## 2022-05-04 PROCEDURE — 97140 MANUAL THERAPY 1/> REGIONS: CPT | Performed by: PHYSICAL THERAPIST

## 2022-05-04 PROCEDURE — 97110 THERAPEUTIC EXERCISES: CPT | Performed by: PHYSICAL THERAPIST

## 2022-05-04 NOTE — PROGRESS NOTES
Daily Note     Today's date: 2022  Patient name: Namita Maldonado  : 1965  MRN: [id number]  Referring provider: GOSIA Guallpa  Dx:   Encounter Diagnosis     ICD-10-CM    1  Cervical radiculopathy  M54 12    2  Right hip pain  M25 551    3  H/O laminectomy  Z98 890                   Subjective: Robbi Peralta explains that his headaches and neck pain have improved, he was taking his one medication wrong and this was corrected by his doctor  Objective: See treatment diary below      Assessment: Tolerated treatment well  Patient demonstrated fatigue post treatment and would benefit from continued PT  Improved soft tissue mobility this session as compared to prior  Continued to keep TE low to avoid irritating sx, will follow up nv after his neurology appointment with updated MRI status  Plan: Continue per plan of care        Precautions: COPD, CPAP, Familial Hx CA      Manuals  3/10 3/14 3/21 4/4 4/7 4/12 4/20 4/25 5/4   SOR  RN RN RN RN RN RN RN RN RN   TPR L UT  RN RN RN RN RN RN RN RN RN   Supine cervical distraction  RN RN  RN RN RN RN RN RN   DANNIE  nv           Neuro Re-Ed                                                                                                        Ther Ex             Seated cervical flexion 10x10'' 10x10'' 10x10'' 10x10'' 10x10'' 10x10'' 10x10'' 10x10'' 10x10'' 10x10''   UT/Lev s 10x10'' 10x10''  UT only 10x10'' UT only 10x10'' UT only 10x10'' UT only 10x10'' UT only 10x10'' UT only 10x10'' UT only 10x10''   Supine chin tuck 10x10'' 10x5''           Shoulder blade squeezes 10x10'' 10x10''           Supine cervical rotation  10x5'' ea                                                  Ther Activity                                       Gait Training                                       Modalities

## 2022-09-06 ENCOUNTER — TELEMEDICINE (OUTPATIENT)
Dept: BEHAVIORAL/MENTAL HEALTH CLINIC | Facility: CLINIC | Age: 57
End: 2022-09-06
Payer: COMMERCIAL

## 2022-09-06 DIAGNOSIS — F32.89 OTHER DEPRESSION: ICD-10-CM

## 2022-09-06 DIAGNOSIS — F43.10 POSTTRAUMATIC STRESS DISORDER: Primary | ICD-10-CM

## 2022-09-06 PROCEDURE — 90834 PSYTX W PT 45 MINUTES: CPT

## 2022-09-06 NOTE — PSYCH
Psychotherapy Provided: Individual Psychotherapy 45 minutes     Length of time in session: 45 minutes, follow up in 2 week    Start time:11:00 AM  End time: 11:43 AM    Encounter Diagnosis     ICD-10-CM    1  Posttraumatic stress disorder  F43 10    2  Other depression  F32 89        Goals addressed in session: Goal 1       Current suicide risk : Low     D: Alex shares he is doing well overall  Reports increased sense of hope, increased motivation, improved mood  Cl demonstrated cognitive distortions with future oriented thinking  Clinician engaged cl in CBT modalities to reframe and challenge negative thoughts patterns and interpretations of past memories related to past traumas  A: Cynthia Bowers is alert and engaged  He is cooperative and open  Denies SI/HI  PHQ9 readministered today with a total of 2; decrease of 6 since last screening on 10/2021  P: Meet in 2 weeks  Identify triggers and discuss next session         [address]: Diagnosis and Treatment Plan explained to Wagner Chang relates understanding diagnosis and is agreeable to Treatment Plan   Yes

## 2022-09-06 NOTE — BH TREATMENT PLAN
Maite Beltran  1965       Date of Initial Treatment Plan: unknown   Date of Current Treatment Plan: 09/06/22    Treatment Plan Number 1     Strengths/Personal Resources for Self Care:   organization skills  caring  smart  family oriented  hard working        Diagnosis:   1  Posttraumatic stress disorder     2  Other depression         Area of Needs: trauma, depression      Long Term Goal 1: To resolve 80% of symptoms related to past trauma experiences within a year    Target Date: 9/6/23  Completion Date: N/A         Short Term Objectives for Goal 1: To learn 1-3 coping skills to cope with symptoms     Long Term Goal 2: To reframe negative thinking patterns associated with past traumas by 85% within a year    Target Date: 9/6/23  Completion Date: N/A    Short Term Objectives for Goal 2: To engage in cognitive behavioral therapy on a consistant biweekly basis           GOAL 1: Modality: Individual 2x per month   Completion Date 9/6/23    GOAL 2: Modality: Individual 2x per month   Completion Date 9/6/23           Behavioral Health Treatment Plan [facility]: Diagnosis and Treatment Plan explained to Isiah Aguilera relates understanding diagnosis and is agreeable to Treatment Plan         Client Comments : Please share your thoughts, feelings, need and/or experiences regarding your treatment plan: n/a

## 2022-09-21 ENCOUNTER — TELEMEDICINE (OUTPATIENT)
Dept: BEHAVIORAL/MENTAL HEALTH CLINIC | Facility: CLINIC | Age: 57
End: 2022-09-21
Payer: COMMERCIAL

## 2022-09-21 DIAGNOSIS — F43.10 POSTTRAUMATIC STRESS DISORDER: Primary | ICD-10-CM

## 2022-09-21 PROCEDURE — 90834 PSYTX W PT 45 MINUTES: CPT

## 2022-09-21 NOTE — PSYCH
Virtual Regular Visit    Verification of patient location:    Patient is located in the following state in which I hold an active license [place]      Assessment/Plan:    Problem List Items Addressed This Visit    None         Goals addressed in session: Goal 1          Reason for visit is No chief complaint on file  Encounter provider [facility]    Provider located at [address] [place] [address] [address]  [address] [place] [address]  [phone]      Recent Visits  No visits were found meeting these conditions  Showing recent visits within past 7 days and meeting all other requirements  Future Appointments  No visits were found meeting these conditions  Showing future appointments within next 150 days and meeting all other requirements       The patient was identified by name and date of birth  Odalis Fernandez was informed that this is a telemedicine visit and that the visit is being conducted through[employer] and patient was informed that this is a secure, HIPAA-compliant platform  He agrees to proceed     My office door was closed  No one else was in the room  He acknowledged consent and understanding of privacy and security of the video platform  The patient has agreed to participate and understands they can discontinue the visit at any time  Patient is aware this is a billable service  DATA: Alex primarily discussed grief and loss and concerns related to family members and their health  Clinician provided support and brought awareness to Alex's fears of losing another family member  We also discussed the triggers of death related to the loss of his son and mother  Reinforced coping skills to manage triggers and low moods  ASSESSMENT: Adeola is alert and engaged  Denies SI/HI  Presents anxious and sad  PLAN: Meet in 2-3 weeks  Clinician coordinate with [facility] via email         Past Medical History: Diagnosis Date    H/O laminectomy 2020    Posterior laminectomy C3-6    Hypothyroidism        Past Surgical History:   Procedure Laterality Date    ELBOW SURGERY Right     HAND TENDON SURGERY         Current Outpatient Medications   Medication Sig Dispense Refill    gabapentin (NEURONTIN) 800 mg tablet Take 800 mg by mouth 3 (three) times a day      levothyroxine 125 mcg tablet Take 125 mcg by mouth daily      methocarbamol (ROBAXIN) 500 mg tablet Take 2 tablets (1,000 mg total) by mouth 3 (three) times a day as needed for muscle spasms 40 tablet 0     No current facility-administered medications for this visit                Start Time: 10:00 am  End Time: 10:46 am  This note was not shared with the patient due to this is a psychotherapy note

## 2022-10-05 ENCOUNTER — TELEPHONE (OUTPATIENT)
Dept: BEHAVIORAL/MENTAL HEALTH CLINIC | Facility: CLINIC | Age: 57
End: 2022-10-05

## 2022-10-05 NOTE — TELEPHONE ENCOUNTER
Attempted to outreach to inform of resignation   No response, will wait for scheduled session to inform, no message left

## 2022-10-10 ENCOUNTER — TELEMEDICINE (OUTPATIENT)
Dept: BEHAVIORAL/MENTAL HEALTH CLINIC | Facility: CLINIC | Age: 57
End: 2022-10-10
Payer: COMMERCIAL

## 2022-10-10 ENCOUNTER — DOCUMENTATION (OUTPATIENT)
Dept: BEHAVIORAL/MENTAL HEALTH CLINIC | Facility: CLINIC | Age: 57
End: 2022-10-10

## 2022-10-10 DIAGNOSIS — F43.10 POSTTRAUMATIC STRESS DISORDER: Primary | ICD-10-CM

## 2022-10-10 PROCEDURE — 90834 PSYTX W PT 45 MINUTES: CPT

## 2022-10-10 NOTE — PSYCH
Start Time: 10:00 am  End Time: 10:45 am      Virtual Regular Visit    Verification of patient location:    Patient is located in the following state in which I hold an active license [place]      Assessment/Plan:    Problem List Items Addressed This Visit    None         Goals addressed in session: Goal 1          Reason for visit is No chief complaint on file  Encounter provider NING Tafoya    Provider located at [address] [place], [place] [id number]  [address] [place] [address]  [phone]      Recent Visits  Date Type Provider Dept   10/05/22 Telephone NING Tafoya [facility] Therapist [facility]   Showing recent visits within past 7 days and meeting all other requirements  Future Appointments  No visits were found meeting these conditions  Showing future appointments within next 150 days and meeting all other requirements       The patient was identified by name and date of birth  Angela Cano was informed that this is a telemedicine visit and that the visit is being conducted through[employer] and patient was informed that this is a secure, HIPAA-compliant platform  He agrees to proceed     My office door was closed  No one else was in the room  He acknowledged consent and understanding of privacy and security of the video platform  The patient has agreed to participate and understands they can discontinue the visit at any time  Patient is aware this is a billable service  DATA: Alex discussed his mood variability as of lately, shares feeling up and down  He processed recent trigger  Yared Wilkerson was informed of clinicians resignation and remainder of session used to reflect on progress  ASSESSMENT: Yaredjanay Wilkerson is alert and engaged  Presents somewhat irritable and tearful at times       PLAN: Transfer to [facility] therapist         [facility]     Past Medical History:   Diagnosis Date   • H/O laminectomy 2020    Posterior laminectomy C3-6 • Hypothyroidism        Past Surgical History:   Procedure Laterality Date   • ELBOW SURGERY Right    • HAND TENDON SURGERY         Current Outpatient Medications   Medication Sig Dispense Refill   • gabapentin (NEURONTIN) 800 mg tablet Take 800 mg by mouth 3 (three) times a day     • levothyroxine 125 mcg tablet Take 125 mcg by mouth daily     • methocarbamol (ROBAXIN) 500 mg tablet Take 2 tablets (1,000 mg total) by mouth 3 (three) times a day as needed for muscle spasms 40 tablet 0     No current facility-administered medications for this visit  No Known Allergies    Review of Systems    Video Exam    There were no vitals filed for this visit      Physical Exam     I spent 45 minutes directly with the patient during this visit    This note was not shared with the patient due to this is a psychotherapy note

## 2022-10-10 NOTE — PROGRESS NOTES
[address]    Patient Name Natali Perez     Date of Birth: 62 y o  1965      MRN: [id number]    Admission Date: 8/31/2021    Date of Transfer: October 10, 2022    Admission Diagnosis:     1  Major Depressive Disorder    Current Diagnosis:     No diagnosis found  Reason for Admission: Frank presented for treatment due to depression  Primary complaints included DEPRESSIVE SYMPTOMS: depressed mood, sadness, low motivation, decreased interest, poor concentration, negative thoughts, irritability  Progress in Treatment: Frank was seen for Individual Couseling  During the course of treatment he demonstrated fair progress  Episodes of Higher Level of Care: No    Transfer request Initiated by: Psychiatrist: None Therapist: Katelin Richard    Reason for Transfer Request: clinician leaving practice    Does this individual need a clinician with specialized training/expertise?: No    Is this client working with any other [facility] Providers/Therapists?  Psychiatrist: None Therapist: None    Other pertinent issues: None    Are there any specific individuals who would be a “best fit” or who have already agreed to accept this transfer request?      Psychiatrist: None   Therapist: None  Rationale: Patient prefers female clinician    Attempts to maintain the current therapeutic relationship: Completed termination session     Transfer request routed to Clinical Coordinator for input and/or approval      Comments from other involved providers and/or clinical coordinator: None    Neelam Rios

## 2022-10-31 ENCOUNTER — TELEPHONE (OUTPATIENT)
Dept: PSYCHIATRY | Facility: CLINIC | Age: 57
End: 2022-10-31

## 2022-10-31 NOTE — TELEPHONE ENCOUNTER
Called pt in regards to KERRI LOZADA from [employer]  [facility] for pt to contact intake at [facility]

## 2022-11-03 ENCOUNTER — TELEPHONE (OUTPATIENT)
Dept: PSYCHIATRY | Facility: CLINIC | Age: 57
End: 2022-11-03

## 2022-11-08 ENCOUNTER — SOCIAL WORK (OUTPATIENT)
Dept: BEHAVIORAL/MENTAL HEALTH CLINIC | Facility: CLINIC | Age: 57
End: 2022-11-08

## 2022-11-08 DIAGNOSIS — F33.9 RECURRENT MAJOR DEPRESSIVE DISORDER, REMISSION STATUS UNSPECIFIED (HCC): Primary | ICD-10-CM

## 2022-11-08 NOTE — PSYCH
Psychotherapy Provided: Individual Psychotherapy 60 minutes     Length of time in session: 60 minutes, follow up in 2 WEEKS    No diagnosis found  Goals addressed in session: Client would like to work on managing depressive symptoms    Pain:  0    none    0    Current suicide risk : Low     Data:  Client presented for an in-person session  Client transferred from another clinician who left practice  Client reports struggling with depression, especially during the holiday season  Client disclosed that he lost a son during this time and how he finds himself struggling with it each year  He shared that he recently lost his mom, which has contributed to his sadness, addressing how this year has been especially difficult  Client voiced that he is unable to look at family videos and photos without getting emotional  Client disclosed experiencing an increase in nightmares during this time and how they can be vivid  Client reported a family history of medical issues and alcoholism  He mentioned witnessing several deaths of family and friends  Disclosed how majority of the deaths were sudden  Assessment:  Client presented with a a calm affect and mood  Client was engaged, talkative and oriented  Speech was clear and organized and tone was within normal range  Mood was congruent  Eye contact was consistent throughout and client was dressed casually but appropriately  Client became emotional at times, but was able to manage them  No reports of SI/HI  Process:  Client processed current events and stressors  Clinician actively listened, provided emotional support and explored client's triggers  Client identified triggers and current coping mechanisms  Client would like to explore other coping mechanisms and find ways to honor his son  Plan:  Client is scheduled for an in-person session on Tuesday November 22nd at 10am  Client will review handout(s) and will discuss next session       Behavioral Health Treatment Plan [facility]: Diagnosis and Treatment Plan explained to Renea Mcdonnell relates understanding diagnosis and is agreeable to Treatment Plan   Yes     Visit start and stop times:    11/08/22  Start Time: 1000  Stop Time: 1100  Total Visit Time: 60 minutes

## 2022-11-22 ENCOUNTER — SOCIAL WORK (OUTPATIENT)
Dept: BEHAVIORAL/MENTAL HEALTH CLINIC | Facility: CLINIC | Age: 57
End: 2022-11-22

## 2022-11-22 DIAGNOSIS — F41.1 GENERALIZED ANXIETY DISORDER: Primary | ICD-10-CM

## 2022-11-22 DIAGNOSIS — F33.1 MAJOR DEPRESSIVE DISORDER, RECURRENT, MODERATE (HCC): ICD-10-CM

## 2022-11-22 NOTE — PSYCH
Psychotherapy Provided: Individual Psychotherapy 58 minutes     Length of time in session: 58 minutes, follow up in 1 WEEK    Encounter Diagnosis     ICD-10-CM    1  Generalized anxiety disorder  F41 1       2  Major depressive disorder, recurrent, moderate (HCC)  F33 1           Goals addressed in session: Anxiety, lack of motivation    Pain:  0    none    0    Current suicide risk : Low     Data:  Client presented for an in-person session  Client expressed that he has been experiencing less nightmares  He mentioned that he was not looking forward to going over his son's house for Thanksgiving, stating that he and his other son have been drinking heavily lately  He expressed that alcoholism runs in the family, and how he does not want to be around this type of behavior  As discussed last session, client talked about not wanting to involve himself in potential drama  Client shared that he has tried to talk to his sons about this and how their drinking concerns him, however, he feels as though they dismiss him  Client highlighted that his grandchildren will be there and how he would like to spend time with them, but is unsure what he will end up doing  Client talked about having no motivation this week and how he was supposed to make several calls  He addressed that last week; he was visited by six of his grandchildren and how happy this made him feel  Assessment:  Client presented with a calm affect and mood  Client was engaged, talkative and oriented  Speech was clear and organized and tone was within normal range  Mood was congruent  Eye contact was consistent throughout and client was dressed casually but appropriately  No reports of SI/HI  Process:  Client processed current events and stressors  Clinician actively listened, provided emotional support and explored communication styles that could be effective when addressing his concerns   Talked about his coping skills to manage stress/anxiety and how to gain more motivation  Discussed the idea of writing a "to-do" list and giving self a timeline  Plan:  Client is scheduled for a telehealth session next week  Client will weigh the pros and cons for attending Thanksgiving  Will look into creating a to-do list      [address]: Diagnosis and Treatment Plan explained to Emilycheryl Beltran relates understanding diagnosis and is agreeable to Treatment Plan  Yes     Visit start and stop times:    11/22/22  Start Time: 1002  Stop Time: 1100  Total Visit Time: 58 minutes     Assessment/Plan:      Diagnoses and all orders for this visit:    Generalized anxiety disorder    Major depressive disorder, recurrent, moderate (HCC)          Subjective:      Patient ID: David Awan is a 62 y o  male  HPI:     Pre-morbid level of function and History of Present Illness: Anxiety, Depression  Previous Psychiatric/psychological treatment/year: Recurrent  Current Psychiatrist/Therapist: Reynaldo Dakin  Outpatient and/or Partial and Other Community Resources Used (CTT, ICM, VNA): N/A      Problem Assessment:     SOCIAL/VOCATION:  Family Constellation (include parents, relationship with each and pertinent Psych/Medical History):     No family history on file  Mother: N/A  Spouse: Alcoholism   Father: Alcoholism  Children: Alcoholism   Sibling: Alcoholism  Sibling:   Children:    Other:     Beverley Triana relates best to son  he lives with no one  he does live alone  Domestic Violence: No past history of domestic violence    Additional Comments related to family/relationships/peer support: N/A  School or Work History (strengths/limitations/needs): Had successful career     Her highest grade level achieved was [employer] history includes N/A    Financial status includes N/A    LEISURE ASSESSMENT (Include past and present hobbies/interests and level of involvement (Ex: Group/Club Affiliations): N/A  his primary language is [place]  Preferred language is [place]  Ethnic considerations are N/A  Religions affiliations and level of involvement N/A   Does spirituality help you cope? No    FUNCTIONAL STATUS: There has been a recent change in Elliot Santo ability to do the following: N/A    Level of Assistance Needed/By Whom?: N/A    Elliot Santo learns best by  demonstration    SUBSTANCE ABUSE ASSESSMENT: no substance abuse    Substance/Route/Age/Amount/Frequency/Last Use: N/A    DETOX HISTORY: N/A    Previous detox/rehab treatment: N/A    HEALTH ASSESSMENT: no referral to PCP needed    LEGAL: No Mental Health Advance Directive or Power of  on file    Prenatal History: N/A    Delivery History: N/A    Developmental Milestones: N/A  Temperament as an infant was N/A  Temperament as a toddler was N/A  Temperament at school age was N/A  Temperament as a teenager was N/A  Risk Assessment:   The following ratings are based on my N/A    Risk of Harm to Self:   Demographic risk factors include N/A  Historical Risk Factors include substance abuse or dependence  Recent Specific Risk Factors include diagnosis of depression   Additional Factors for a Child or Adolescent substance abuse or dependence     Risk of Harm to Others:   Demographic Risk Factors include male  Historical Risk Factors include N/A  Recent Specific Risk Factors include N/A    Access to Weapons:   Elliot Santo has access to the following weapons: N/A   The following steps have been taken to ensure weapons are properly secured: N/A    Based on the above information, the client presents the following risk of harm to self or others:  low    The following interventions are recommended:   no intervention changes    Notes regarding this Risk Assessment: N/A        Review Of Systems:     Mood Normal   Behavior Normal    Thought Content Normal   General Normal    Personality Normal   Other Psych Symptoms Normal   Constitutional Normal   ENT Normal   Cardiovascular Normal    Respiratory Normal Gastrointestinal Normal   Genitourinary Normal    Musculoskeletal Negative   Integumentary Normal    Neurological Normal    Endocrine Normal          Mental status:  Appearance calm and cooperative    Mood euthymic   Affect affect appropriate    Speech a normal rate   Thought Processes normal thought processes   Hallucinations no hallucinations present    Thought Content no delusions   Abnormal Thoughts no suicidal thoughts  and no homicidal thoughts    Orientation  oriented to person and place and time   Remote Memory short term memory intact   Attention Span concentration intact   Intellect Appears to be of Average Intelligence   Fund of Knowledge displays adequate knowledge of current events   Insight Insight intact   Judgement judgment was intact   Muscle Strength Muscle strength and tone were normal   Language no difficulty naming common objects   Pain none   Pain Scale 0     Visit start and stop times:    11/22/22  Start Time: 1002  Stop Time: 1100  Total Visit Time: 58 minutes

## 2022-11-22 NOTE — PSYCH
Psychotherapy Provided: {PSYCHOTHERAPY:69311}     Length of time in session: *** minutes, follow up in *** {WEEK/MONTH/YEAR:21224}    No diagnosis found  Goals addressed in session: {GOALS:51739}     Pain:      {PSYCH MENTAL STATUS PAIN (Optional):56316}    {PAIN SCALE NUMBERS:82658}    Current suicide risk : {CURRENT SUICIDE NWCB:07193}    ***    Behavioral Health Treatment Plan [facility]: Diagnosis and Treatment Plan explained to Anival Lipoma relates understanding diagnosis and is agreeable to Treatment Plan   {YES (DEF)/NO:70706}    Visit start and stop times:    11/22/22  Start Time: 1002  Stop Time: 1100  Total Visit Time: 58 minutes

## 2022-12-14 ENCOUNTER — TELEMEDICINE (OUTPATIENT)
Dept: BEHAVIORAL/MENTAL HEALTH CLINIC | Facility: CLINIC | Age: 57
End: 2022-12-14

## 2022-12-14 DIAGNOSIS — F32.A DEPRESSION, UNSPECIFIED DEPRESSION TYPE: ICD-10-CM

## 2022-12-14 DIAGNOSIS — F41.1 GENERALIZED ANXIETY DISORDER: Primary | ICD-10-CM

## 2022-12-14 NOTE — PSYCH
Virtual Regular Visit    Verification of patient location:    Patient is located in the following state in which I hold an active license [place]; Currently working towards [place] licensure      Assessment/Plan:    Problem List Items Addressed This Visit    None  Visit Diagnoses     Generalized anxiety disorder    -  Primary    Depression, unspecified depression type              Goals addressed in session: Anxiety         Reason for visit is No chief complaint on file  Encounter provider Charissa Chowdhury    Provider located at [address] [place] [address] [address]  [address] [place] [address]  [phone]      Recent Visits  No visits were found meeting these conditions  Showing recent visits within past 7 days and meeting all other requirements  Today's Visits  Date Type Provider Dept   12/14/22 Telemedicine [facility] Therapist [facility]   Showing today's visits and meeting all other requirements  Future Appointments  No visits were found meeting these conditions  Showing future appointments within next 150 days and meeting all other requirements       The patient was identified by name and date of birth  Natali Perez was informed that this is a telemedicine visit and that the visit is being conducted throughthe [employer] ([employer]) platform  He agrees to proceed     My office door was closed  No one else was in the room  He acknowledged consent and understanding of privacy and security of the video platform  The patient has agreed to participate and understands they can discontinue the visit at any time  Patient is aware this is a billable service  Subjective  Natali Perez is a 62 y o  male          HPI     Past Medical History:   Diagnosis Date   • H/O laminectomy 2020    Posterior laminectomy C3-6   • Hypothyroidism        Past Surgical History:   Procedure Laterality Date   • ELBOW SURGERY Right    • HAND TENDON SURGERY Current Outpatient Medications   Medication Sig Dispense Refill   • gabapentin (NEURONTIN) 800 mg tablet Take 800 mg by mouth 3 (three) times a day     • levothyroxine 125 mcg tablet Take 125 mcg by mouth daily     • methocarbamol (ROBAXIN) 500 mg tablet Take 2 tablets (1,000 mg total) by mouth 3 (three) times a day as needed for muscle spasms 40 tablet 0     No current facility-administered medications for this visit  No Known Allergies    Review of Systems    Video Exam    There were no vitals filed for this visit  Physical Exam     Data:  Client presented for his appointment via [address] [place] [address] (telehealth platform)  Client reported that his car had broken down and was borrowing his son's car  He mentioned that he has been working through with managing his mother's estate and how stressful the process has been  He shared that he feels as though he is getting the "run around," stating that he keeps having to contact the same people over and over again; receiving the same response  Client disclosed that he had the opportunity to talk to his sons about their drinking  He noted that at first they did not take it well, however, he feels that by the end of the conversation; they were at an understanding with one another  Since, he has noticed that they have decreased their drinking and they are on good terms  Client talked in-length about his health and how he has been dealing with neck and arm pain  He explained having a neurologist that he needs to contact but is afraid of what his doctor will have to say  He mentioned having several surgeries and how he is not looking forward to needing another one  Client shared that he was recommended to have surgery regarding his neck  Client voiced that it would be in his best interest to connect with his doctors and not allow this pain to go further  Assessment:  Client presented with a calm affect and mood  Client was engaged, talkative and oriented   Speech was clear and organized and tone was within normal range  Mood was congruent  Eye contact was consistent throughout and client was dressed casually but appropriately  No reports of SI/HI  Process:  Client processed current events and stressors  Clinician actively listened, provided emotional support and encouraged client to reach out to his doctors  Discussed the benefits of getting his neck and arm checked out  Talked about taking things slowly and to not overdo it  Plan:   Client is scheduled for a telehealth session on Wednesday December 21st at [facility]  Client planned to call his doctors later this week      Visit Time  12/14/2022  Visit Start Time: 4456  Visit Stop Time: 6425  Total Visit Duration: 45 minutes

## 2022-12-21 ENCOUNTER — TELEPHONE (OUTPATIENT)
Dept: BEHAVIORAL/MENTAL HEALTH CLINIC | Facility: CLINIC | Age: 57
End: 2022-12-21

## 2022-12-21 NOTE — TELEPHONE ENCOUNTER
Client did not show for scheduled telehealth session  Clinician called to inquire  Left voicemail requesting a callback to schedule next appointment

## 2023-02-06 ENCOUNTER — TELEMEDICINE (OUTPATIENT)
Dept: BEHAVIORAL/MENTAL HEALTH CLINIC | Facility: CLINIC | Age: 58
End: 2023-02-06

## 2023-02-06 DIAGNOSIS — F32.A DEPRESSION, UNSPECIFIED DEPRESSION TYPE: ICD-10-CM

## 2023-02-06 DIAGNOSIS — F41.1 GENERALIZED ANXIETY DISORDER: Primary | ICD-10-CM

## 2023-02-06 NOTE — PSYCH
Virtual Regular Visit    Verification of patient location:    Patient is located in the following state in which I hold an active license Other; [place]      Assessment/Plan:    Problem List Items Addressed This Visit    None  Visit Diagnoses     Generalized anxiety disorder    -  Primary    Depression, unspecified depression type              Goals addressed in session: Goal 1          Reason for visit is   Chief Complaint   Patient presents with   • Virtual Regular Visit        Encounter provider Fam Ashby    Provider located at [address] [place] [address]  [phone]      Recent Visits  No visits were found meeting these conditions  Showing recent visits within past 7 days and meeting all other requirements  Today's Visits  Date Type Provider Dept   02/06/23 Telemedicine [facility] Therapist [facility]   Showing today's visits and meeting all other requirements  Future Appointments  No visits were found meeting these conditions  Showing future appointments within next 150 days and meeting all other requirements       The patient was identified by name and date of birth  Flor Wallis was informed that this is a telemedicine visit and [facility]  He agrees to proceed     My office door was closed  No one else was in the room  He acknowledged consent and understanding of privacy and security of the video platform  The patient has agreed to participate and understands they can discontinue the visit at any time  Patient is aware this is a billable service  Subjective  Flor Wallis is a 62 y o  male         HPI     Past Medical History:   Diagnosis Date   • H/O laminectomy 2020    Posterior laminectomy C3-6   • Hypothyroidism        Past Surgical History:   Procedure Laterality Date   • ELBOW SURGERY Right    • HAND TENDON SURGERY         Current Outpatient Medications   Medication Sig Dispense Refill   • gabapentin (NEURONTIN) 800 mg tablet Take 800 mg by mouth 3 (three) times a day     • levothyroxine 125 mcg tablet Take 125 mcg by mouth daily     • methocarbamol (ROBAXIN) 500 mg tablet Take 2 tablets (1,000 mg total) by mouth 3 (three) times a day as needed for muscle spasms 40 tablet 0     No current facility-administered medications for this visit  No Known Allergies    Review of Systems    Video Exam    There were no vitals filed for this visit  Physical Exam     Behavioral Health Psychotherapy Progress Note    Psychotherapy Provided: Individual Psychotherapy     1  Generalized anxiety disorder        2  Depression, unspecified depression type            Goals addressed in session: Goal 1     DATA: Client presented for his telehealth appointment via [address]  Client reported that he has been feeling overwhelmed with sadness  He mentioned that his sister had gone to the hospital due to her COPD; expressed that she has bad COPD and going to the hospital is common for her  However, client addressed that they had run some tests and found that she was diagnosed with stage four uterine cancer  He shared that the cancer had metastasized to other parts of her body  He mentioned that she was not going to go through with chemotherapy, but rather radiation to help stop the bleeding  Client acknowledged her reasoning for not wanting to pursue chemo, but has been devastated that she had designated most of her life to help others and felt this was unfair  Client also spoke about this past month was the anniversary of his son's passing  Client shared that he has been having difficulty sleeping and how he is finding himself going down this spiral  Client talked about wanting to connect with a psychiatrist who could potentially stabilize his mood   Discussed looking into support groups that he can connect with  During this session, this clinician used the following therapeutic modalities: Client-centered Therapy and Cognitive Behavioral Therapy    Substance Abuse was not addressed during this session  If the client is diagnosed with a co-occurring substance use disorder, please indicate any changes in the frequency or amount of use: N/A  Stage of change for addressing substance use diagnoses: No substance use/Not applicable    ASSESSMENT:  Manohar Perera presents with a Euthymic/ normal and Depressed mood  his affect is Normal range and intensity and Tearful, which is congruent, with his mood and the content of the session  The client has made progress on their goals  Manohar Perera presents with a low risk of suicide, low risk of self-harm, and low risk of harm to others  For any risk assessment that surpasses a "low" rating, a safety plan must be developed  A safety plan was indicated: no  If yes, describe in detail N/A    PLAN: Between sessions, Manohar Perera will continue to manage anxiety and depression  Will look into getting connected to a psychiatrist  Will utilize coping skills  At the next session, the therapist will use Client-centered Therapy and Cognitive Behavioral Therapy to address anxiety and depression  Behavioral Health Treatment Plan and Discharge Planning: Manohar Perera is aware of and agrees to continue to work on their treatment plan  They have identified and are working toward their discharge goals   yes    Visit start and stop times:    02/06/23  Start Time: 1008  Stop Time: 1100  Total Visit Time: 52 minutes

## 2023-02-16 ENCOUNTER — TELEMEDICINE (OUTPATIENT)
Dept: BEHAVIORAL/MENTAL HEALTH CLINIC | Facility: CLINIC | Age: 58
End: 2023-02-16

## 2023-02-16 DIAGNOSIS — F33.2 MAJOR DEPRESSIVE DISORDER, RECURRENT SEVERE WITHOUT PSYCHOTIC FEATURES (HCC): ICD-10-CM

## 2023-02-16 DIAGNOSIS — F41.1 GENERALIZED ANXIETY DISORDER: Primary | ICD-10-CM

## 2023-02-16 NOTE — PSYCH
Virtual Regular Visit    Verification of patient location:    Patient is located in the following state in which I hold an active license Other; [place]       Assessment/Plan:    Problem List Items Addressed This Visit    None  Visit Diagnoses     Generalized anxiety disorder    -  Primary    Major depressive disorder, recurrent severe without psychotic features ([facility] [address] )              Goals addressed in session: Goal 1          Reason for visit is   Chief Complaint   Patient presents with   • Virtual Regular Visit        Encounter provider Gilbert Marks    Provider located at [address] [place] [address]  [phone]      Recent Visits  No visits were found meeting these conditions  Showing recent visits within past 7 days and meeting all other requirements  Today's Visits  Date Type Provider Dept   02/16/23 Telemedicine [facility] Therapist [facility]   Showing today's visits and meeting all other requirements  Future Appointments  No visits were found meeting these conditions  Showing future appointments within next 150 days and meeting all other requirements       The patient was identified by name and date of birth  Carly Farooq was informed that this is a telemedicine visit and [facility]  He agrees to proceed     My office door was closed  No one else was in the room  He acknowledged consent and understanding of privacy and security of the video platform  The patient has agreed to participate and understands they can discontinue the visit at any time  Patient is aware this is a billable service  Subjective  Carly Farooq is a 62 y o  male         HPI     Past Medical History:   Diagnosis Date   • H/O laminectomy 2020    Posterior laminectomy C3-6   • Hypothyroidism        Past Surgical History:   Procedure Laterality Date   • ELBOW SURGERY Right    • HAND TENDON SURGERY         Current Outpatient Medications   Medication Sig Dispense Refill   • gabapentin (NEURONTIN) 800 mg tablet Take 800 mg by mouth 3 (three) times a day     • levothyroxine 125 mcg tablet Take 125 mcg by mouth daily     • methocarbamol (ROBAXIN) 500 mg tablet Take 2 tablets (1,000 mg total) by mouth 3 (three) times a day as needed for muscle spasms 40 tablet 0     No current facility-administered medications for this visit  No Known Allergies    Review of Systems    Video Exam    There were no vitals filed for this visit  Physical Exam     Behavioral Health Psychotherapy Progress Note    Psychotherapy Provided: Individual Psychotherapy     1  Generalized anxiety disorder        2  Major depressive disorder, recurrent severe without psychotic features ([facility]            Goals addressed in session: Goal 1     DATA: Client presented for a telehealth session via [address]  Client reported that he had been recovering from an upper respiratory infection  He shared that he was put on steroids to help, but found out afterwards that he should not have been prescribed steroids  He noted that during the time he was taking them; he was experiencing severe muscle spasms and other neurological symptoms  Client talked about his sister (who was recently diagnosed with stage four cancer) and how she has accepted her diagnosis  Client shared that he has been talking with his sons about his aunt and how he finds this to be helpful  They encourage him that they will get through this together  Client voiced that this has helped them better communicate with one another  Client addressed that he is working on focusing on the here and now and to not dwell on the future  He highlighted positive things that have happened throughout the week    During this session, this clinician used the following therapeutic modalities: Client-centered Therapy and Cognitive Behavioral Therapy    Substance Abuse was not addressed during this session  If the client is diagnosed with a co-occurring substance use disorder, please indicate any changes in the frequency or amount of use: N/A  Stage of change for addressing substance use diagnoses: No substance use/Not applicable    ASSESSMENT:  Nick Del Angel presents with a Euthymic/ normal mood  his affect is Normal range and intensity, which is congruent, with his mood and the content of the session  The client has made progress on their goals  Nick Del Angel presents with a low risk of suicide, low risk of self-harm, and low risk of harm to others  For any risk assessment that surpasses a "low" rating, a safety plan must be developed  A safety plan was indicated: no  If yes, describe in detail N/A    PLAN: Between sessions, Nick Del Angel will work on thinking about the here and now  Focusing on the present moment and enjoying the time he has to talk and see his sister  At the next session, the therapist will use Client-centered Therapy and Cognitive Behavioral Therapy to address anxiety  Behavioral Health Treatment Plan and Discharge Planning: Nick Del Angel is aware of and agrees to continue to work on their treatment plan  They have identified and are working toward their discharge goals   yes    Visit start and stop times:    02/16/23  Start Time: 1006  Stop Time: 1100  Total Visit Time: 54 minutes

## 2023-02-28 ENCOUNTER — TELEMEDICINE (OUTPATIENT)
Dept: BEHAVIORAL/MENTAL HEALTH CLINIC | Facility: CLINIC | Age: 58
End: 2023-02-28

## 2023-02-28 DIAGNOSIS — F41.1 GENERALIZED ANXIETY DISORDER: Primary | ICD-10-CM

## 2023-02-28 NOTE — PSYCH
Virtual Regular Visit    Verification of patient location:    Patient is located in the following state in which I hold an active license Other; [place]       Assessment/Plan:    Problem List Items Addressed This Visit    None  Visit Diagnoses     Generalized anxiety disorder    -  Primary          Goals addressed in session: Goal 1          Reason for visit is   Chief Complaint   Patient presents with   • Virtual Regular Visit        Encounter provider Ananda Lamb    Provider located at [address] [place] [address] [address]  [address] [place] [address]  [phone]      Recent Visits  No visits were found meeting these conditions  Showing recent visits within past 7 days and meeting all other requirements  Today's Visits  Date Type Provider Dept   02/28/23 Telemedicine [facility] Therapist [facility]   Showing today's visits and meeting all other requirements  Future Appointments  No visits were found meeting these conditions  Showing future appointments within next 150 days and meeting all other requirements       The patient was identified by name and date of birth  Keysha Mancuso was informed that this is a telemedicine visit and [facility]  He agrees to proceed     My office door was closed  No one else was in the room  He acknowledged consent and understanding of privacy and security of the video platform  The patient has agreed to participate and understands they can discontinue the visit at any time  Patient is aware this is a billable service  Subjective  Keysha Mancuso is a 62 y o  male         HPI     Past Medical History:   Diagnosis Date   • H/O laminectomy 2020    Posterior laminectomy C3-6   • Hypothyroidism        Past Surgical History:   Procedure Laterality Date   • ELBOW SURGERY Right    • HAND TENDON SURGERY Current Outpatient Medications   Medication Sig Dispense Refill   • gabapentin (NEURONTIN) 800 mg tablet Take 800 mg by mouth 3 (three) times a day     • levothyroxine 125 mcg tablet Take 125 mcg by mouth daily     • methocarbamol (ROBAXIN) 500 mg tablet Take 2 tablets (1,000 mg total) by mouth 3 (three) times a day as needed for muscle spasms 40 tablet 0     No current facility-administered medications for this visit  No Known Allergies    Review of Systems    Video Exam    There were no vitals filed for this visit  Physical Exam     Behavioral Health Psychotherapy Progress Note    Psychotherapy Provided: Individual Psychotherapy     1  Generalized anxiety disorder            Goals addressed in session: Goal 1     DATA: Client presented for a telehealth session via [employer]  Client reported that he was feeling pretty good  He mentioned that he was in a better head space; clear minded  He shared that he had submitted documentation for his mother's will at the   Client addressed that this has been his only frustration, expressing how he has been going back and forth with it for months  Client talked with his sister who seems to be in pretty good spirits, just tired after radiation  He talked about having the opportunity to take care of things that he was unable to when he was sick  Client strongly believed that his illness caused him to struggle mentally and physically  He noted that he is feeling more like himself again  Client reported that his daughter tested positive for COVID, but has been feeling pretty good otherwise  He shared that she had gotten vaccinated and has been up to date on her booster, which he is thankful for  Client reported that he has been playing COD and how this is a stress reliever  Mid Kristian Perkins will be his one son's birthday (who passed) and how he is looking forward to this day  He emphasized that his family all get together, laugh and reminisce      During this session, this clinician used the following therapeutic modalities: Client-centered Therapy and Cognitive Behavioral Therapy    Substance Abuse was not addressed during this session  If the client is diagnosed with a co-occurring substance use disorder, please indicate any changes in the frequency or amount of use: N/A  Stage of change for addressing substance use diagnoses: No substance use/Not applicable    ASSESSMENT:  Anita Diallo presents with a Euthymic/ normal mood  his affect is Normal range and intensity, which is congruent, with his mood and the content of the session  The client has made progress on their goals  Anita Diallo presents with a low risk of suicide, low risk of self-harm, and low risk of harm to others  For any risk assessment that surpasses a "low" rating, a safety plan must be developed  A safety plan was indicated: no  If yes, describe in detail N/A    PLAN: Between sessions, Anita Diallo will continue to work on himself and utilize coping skills to manage stress/anxiety  At the next session, the therapist will use Client-centered Therapy and Cognitive Behavioral Therapy to address anxiety  Behavioral Health Treatment Plan and Discharge Planning: Anita Diallo is aware of and agrees to continue to work on their treatment plan  They have identified and are working toward their discharge goals   yes    Visit start and stop times:    02/28/23  Start Time: 1109  Stop Time: 1200  Total Visit Time: 51 minutes

## 2023-03-14 ENCOUNTER — TELEPHONE (OUTPATIENT)
Dept: BEHAVIORAL/MENTAL HEALTH CLINIC | Facility: CLINIC | Age: 58
End: 2023-03-14

## 2023-03-14 NOTE — TELEPHONE ENCOUNTER
Clinician returned client's call regarding scheduling  Left message requesting a callback to reschedule appointment

## 2023-03-14 NOTE — TELEPHONE ENCOUNTER
Client called needing to reschedule appointment today  Clinician to follow-up to schedule next appointment

## 2023-11-09 ENCOUNTER — DOCUMENTATION (OUTPATIENT)
Dept: BEHAVIORAL/MENTAL HEALTH CLINIC | Facility: CLINIC | Age: 58
End: 2023-11-09

## 2023-11-09 NOTE — PROGRESS NOTES
Psychotherapy Discharge Summary    Preferred Name: Jesús Frey  YOB: 1965    Admission date to psychotherapy: November 8, 2022    Referred by: Self    Presenting Problem: Anxiety, depression, PTSD    Course of treatment included : individual therapy     Progress/Outcome of Treatment Goals (brief summary of course of treatment) Managing anxiety, depression and PTSD symptoms    Treatment Complications (if any): N/A    Treatment Progress: fair    Current [facility] Psychiatric Provider: N/A    Discharge Medications include: N/A    Discharge Date: November 9, 2023    Discharge Diagnosis: No diagnosis found.     Criteria for Discharge: demonstrated failure to uphold their treatment plan/contract    Aftercare recommendations include (include specific referral names and phone numbers, if appropriate): N/A    Prognosis: fair

## 2024-07-07 ENCOUNTER — HOSPITAL ENCOUNTER (EMERGENCY)
Facility: HOSPITAL | Age: 59
Discharge: HOME | End: 2024-07-07
Attending: EMERGENCY MEDICINE
Payer: COMMERCIAL

## 2024-07-07 VITALS
HEIGHT: 74 IN | SYSTOLIC BLOOD PRESSURE: 136 MMHG | DIASTOLIC BLOOD PRESSURE: 86 MMHG | WEIGHT: 265.65 LBS | TEMPERATURE: 98.4 F | BODY MASS INDEX: 34.09 KG/M2 | RESPIRATION RATE: 20 BRPM | HEART RATE: 93 BPM | OXYGEN SATURATION: 99 %

## 2024-07-07 DIAGNOSIS — L03.90 CELLULITIS: Primary | ICD-10-CM

## 2024-07-07 PROCEDURE — 99282 EMERGENCY DEPT VISIT SF MDM: CPT

## 2024-07-07 PROCEDURE — 99284 EMERGENCY DEPT VISIT MOD MDM: CPT | Performed by: EMERGENCY MEDICINE

## 2024-07-07 RX ORDER — OXYCODONE HYDROCHLORIDE 5 MG/1
5 TABLET ORAL EVERY 6 HOURS PRN
Qty: 15 TABLET | Refills: 0 | Status: SHIPPED | OUTPATIENT
Start: 2024-07-07 | End: 2024-07-17

## 2024-07-07 RX ORDER — OXYCODONE HYDROCHLORIDE 5 MG/1
5 TABLET ORAL ONCE
Status: COMPLETED | OUTPATIENT
Start: 2024-07-07 | End: 2024-07-07

## 2024-07-07 RX ORDER — IBUPROFEN 600 MG/1
600 TABLET ORAL EVERY 6 HOURS PRN
Qty: 30 TABLET | Refills: 0 | Status: SHIPPED | OUTPATIENT
Start: 2024-07-07

## 2024-07-07 RX ORDER — CEPHALEXIN 500 MG/1
500 CAPSULE ORAL EVERY 6 HOURS SCHEDULED
Qty: 28 CAPSULE | Refills: 0 | Status: SHIPPED | OUTPATIENT
Start: 2024-07-07 | End: 2024-07-14

## 2024-07-07 RX ORDER — IBUPROFEN 600 MG/1
600 TABLET ORAL ONCE
Status: COMPLETED | OUTPATIENT
Start: 2024-07-07 | End: 2024-07-07

## 2024-07-07 RX ORDER — CEPHALEXIN 250 MG/1
500 CAPSULE ORAL ONCE
Status: COMPLETED | OUTPATIENT
Start: 2024-07-07 | End: 2024-07-07

## 2024-07-07 RX ADMIN — IBUPROFEN 600 MG: 600 TABLET, FILM COATED ORAL at 22:57

## 2024-07-07 RX ADMIN — OXYCODONE HYDROCHLORIDE 5 MG: 5 TABLET ORAL at 23:03

## 2024-07-07 RX ADMIN — CEPHALEXIN 500 MG: 250 CAPSULE ORAL at 22:57

## 2024-07-08 NOTE — ED PROVIDER NOTES
History  Chief Complaint   Patient presents with    Rash     Pt c/o Gout flare up on the left foot. Pt stated this flare up started Friday night, progressively gotten worse.       59-year-old male with a history of gout presents for evaluation of redness to left dorsal medial foot that started a few days ago.  Patient denies taking any pain medication prior to arrival.  Tender to palpation and movement although is able to range.  Reports feels similar to prior episodes of gout.        Prior to Admission Medications   Prescriptions Last Dose Informant Patient Reported? Taking?   gabapentin (NEURONTIN) 800 mg tablet   Yes No   Sig: Take 800 mg by mouth 3 (three) times a day   levothyroxine 125 mcg tablet   Yes No   Sig: Take 125 mcg by mouth daily   methocarbamol (ROBAXIN) 500 mg tablet   No No   Sig: Take 2 tablets (1,000 mg total) by mouth 3 (three) times a day as needed for muscle spasms      Facility-Administered Medications: None       Past Medical History:   Diagnosis Date    H/O laminectomy 2020    Posterior laminectomy C3-6    Hypothyroidism        Past Surgical History:   Procedure Laterality Date    ELBOW SURGERY Right     HAND TENDON SURGERY         History reviewed. No pertinent family history.  I have reviewed and agree with the history as documented.    E-Cigarette/Vaping    E-Cigarette Use Never User      E-Cigarette/Vaping Substances    Nicotine No     THC No     CBD No     Flavoring No     Other No     Unknown No      Social History     Tobacco Use    Smoking status: Every Day     Current packs/day: 1.50     Types: Cigarettes    Smokeless tobacco: Never   Vaping Use    Vaping status: Never Used   Substance Use Topics    Alcohol use: Yes     Comment: occasionally    Drug use: Never       Review of Systems   Skin:  Positive for color change.       Physical Exam  Physical Exam  Vitals and nursing note reviewed.   Constitutional:       General: He is not in acute distress.     Appearance: He is  well-developed.   HENT:      Head: Normocephalic and atraumatic.      Right Ear: External ear normal.      Left Ear: External ear normal.      Nose: Nose normal.   Eyes:      General: No scleral icterus.  Pulmonary:      Effort: Pulmonary effort is normal. No respiratory distress.   Abdominal:      General: There is no distension.      Palpations: Abdomen is soft.   Musculoskeletal:         General: Tenderness present. No deformity. Normal range of motion.      Cervical back: Normal range of motion and neck supple.      Comments: Left dorsal medial foot with erythema around first MTP.  Erythema extends to the midfoot concerning for cellulitis   Skin:     General: Skin is warm.      Findings: Erythema present. No rash.   Neurological:      General: No focal deficit present.      Mental Status: He is alert.      Gait: Gait normal.   Psychiatric:         Mood and Affect: Mood normal.         Vital Signs  ED Triage Vitals [07/07/24 2233]   Temperature Pulse Respirations Blood Pressure SpO2   98.4 °F (36.9 °C) 101 20 136/86 99 %      Temp Source Heart Rate Source Patient Position - Orthostatic VS BP Location FiO2 (%)   Temporal Monitor Sitting Left arm --      Pain Score       10 - Worst Possible Pain           Vitals:    07/07/24 2233 07/07/24 2237   BP: 136/86    Pulse: 101 93   Patient Position - Orthostatic VS: Sitting          Visual Acuity      ED Medications  Medications   ibuprofen (MOTRIN) tablet 600 mg (600 mg Oral Given 7/7/24 2257)   oxyCODONE (ROXICODONE) IR tablet 5 mg (5 mg Oral Given 7/7/24 2303)   cephalexin (KEFLEX) capsule 500 mg (500 mg Oral Given 7/7/24 2257)       Diagnostic Studies  Results Reviewed       None                   No orders to display              Procedures  Procedures         ED Course                               SBIRT 22yo+      Flowsheet Row Most Recent Value   Initial Alcohol Screen: US AUDIT-C     1. How often do you have a drink containing alcohol? 0 Filed at: 07/07/2024 2231    2. How many drinks containing alcohol do you have on a typical day you are drinking?  0 Filed at: 07/07/2024 2236   3a. Male UNDER 65: How often do you have five or more drinks on one occasion? 0 Filed at: 07/07/2024 2236   3b. FEMALE Any Age, or MALE 65+: How often do you have 4 or more drinks on one occassion? 0 Filed at: 07/07/2024 2236   Audit-C Score 0 Filed at: 07/07/2024 2236   IZAIAH: How many times in the past year have you...    Used an illegal drug or used a prescription medication for non-medical reasons? Never Filed at: 07/07/2024 2236                      Medical Decision Making  59-year-old male with a history of gout presenting with localized erythema.  Suspect gout with possible overlying cellulitis.  Pain control with NSAIDs and oxycodone.  Also administer antibiotics.  Close follow-up with PCP advised.  Return precautions discussed    Risk  Prescription drug management.             Disposition  Final diagnoses:   Cellulitis     Time reflects when diagnosis was documented in both MDM as applicable and the Disposition within this note       Time User Action Codes Description Comment    7/7/2024 11:01 PM Joel Yepez Add [L03.90] Cellulitis           ED Disposition       ED Disposition   Discharge    Condition   Stable    Date/Time   Sun Jul 7, 2024 2301    Comment   Armin Bronson discharge to home/self care.                   Follow-up Information       Follow up With Specialties Details Why Contact Info Additional Information    Shira Parham MD Family Medicine   [address] [place] [id number]  [phone]        [facility] Emergency Department Emergency Medicine  If symptoms worsen [address] [facility] [id number] [phone] [facility] Emergency Department, [address] [place], [place] [address]            Discharge Medication List as of 7/7/2024 11:04 PM        START taking these medications     Details   cephalexin (KEFLEX) 500 mg capsule Take 1 capsule (500 mg total) by mouth every 6 (six) hours for 7 days, Starting Sun 7/7/2024, Until Sun 7/14/2024, Normal      ibuprofen (MOTRIN) 600 mg tablet Take 1 tablet (600 mg total) by mouth every 6 (six) hours as needed for mild pain or moderate pain, Starting Sun 7/7/2024, Normal      oxyCODONE (Roxicodone) 5 immediate release tablet Take 1 tablet (5 mg total) by mouth every 6 (six) hours as needed for severe pain for up to 10 days Max Daily Amount: 20 mg, Starting Sun 7/7/2024, Until Wed 7/17/2024 at 2359, Normal           CONTINUE these medications which have NOT CHANGED    Details   gabapentin (NEURONTIN) 800 mg tablet Take 800 mg by mouth 3 (three) times a day, Historical Med      levothyroxine 125 mcg tablet Take 125 mcg by mouth daily, Historical Med      methocarbamol (ROBAXIN) 500 mg tablet Take 2 tablets (1,000 mg total) by mouth 3 (three) times a day as needed for muscle spasms, Starting Tue 4/19/2022, Normal             No discharge procedures on file.    PDMP Review         Value Time User    PDMP Reviewed  Yes 4/19/2022  7:58 PM Dung Tolbert DO            ED Provider  Electronically Signed by             Joel Yepez DO  07/08/24 0107

## 2024-12-01 ENCOUNTER — HOSPITAL ENCOUNTER (EMERGENCY)
Facility: HOSPITAL | Age: 59
Discharge: HOME | End: 2024-12-01
Admitting: EMERGENCY MEDICINE
Payer: COMMERCIAL

## 2024-12-01 VITALS
HEART RATE: 74 BPM | TEMPERATURE: 97.8 F | DIASTOLIC BLOOD PRESSURE: 88 MMHG | RESPIRATION RATE: 20 BRPM | SYSTOLIC BLOOD PRESSURE: 135 MMHG | OXYGEN SATURATION: 99 %

## 2024-12-01 DIAGNOSIS — K05.6 PERIODONTAL DISEASE: Primary | ICD-10-CM

## 2024-12-01 DIAGNOSIS — J01.00 ACUTE NON-RECURRENT MAXILLARY SINUSITIS: ICD-10-CM

## 2024-12-01 PROCEDURE — 99282 EMERGENCY DEPT VISIT SF MDM: CPT

## 2024-12-01 PROCEDURE — 99284 EMERGENCY DEPT VISIT MOD MDM: CPT

## 2024-12-01 RX ORDER — ACETAMINOPHEN 325 MG/1
975 TABLET ORAL ONCE
Status: COMPLETED | OUTPATIENT
Start: 2024-12-01 | End: 2024-12-01

## 2024-12-01 RX ORDER — OXYCODONE HYDROCHLORIDE 5 MG/1
5 TABLET ORAL EVERY 6 HOURS PRN
Qty: 12 TABLET | Refills: 0 | Status: SHIPPED | OUTPATIENT
Start: 2024-12-01

## 2024-12-01 RX ORDER — OXYCODONE HYDROCHLORIDE 5 MG/1
5 TABLET ORAL ONCE
Refills: 0 | Status: COMPLETED | OUTPATIENT
Start: 2024-12-01 | End: 2024-12-01

## 2024-12-01 RX ORDER — OXYCODONE HYDROCHLORIDE 5 MG/1
5 TABLET ORAL EVERY 6 HOURS PRN
Qty: 28 TABLET | Refills: 0 | Status: SHIPPED | OUTPATIENT
Start: 2024-12-01 | End: 2024-12-01

## 2024-12-01 RX ADMIN — ACETAMINOPHEN 975 MG: 325 TABLET, FILM COATED ORAL at 17:46

## 2024-12-01 RX ADMIN — OXYCODONE HYDROCHLORIDE 5 MG: 5 TABLET ORAL at 17:46

## 2024-12-01 RX ADMIN — AMOXICILLIN AND CLAVULANATE POTASSIUM 1 TABLET: 875; 125 TABLET, COATED ORAL at 17:46

## 2024-12-01 NOTE — ED PROVIDER NOTES
Time reflects when diagnosis was documented in both MDM as applicable and the Disposition within this note       Time User Action Codes Description Comment    12/1/2024  5:42 PM Cecile Franco [K05.6] Periodontal disease     12/1/2024  5:42 PM Cecile Franco [J01.00] Acute non-recurrent maxillary sinusitis           ED Disposition       ED Disposition   Discharge    Condition   Stable    Date/Time   Sun Dec 1, 2024  5:42 PM    Comment   Armin Bronson discharge to home/self care.                   Assessment & Plan       Medical Decision Making  DDx including but not limited to: dental irina, dental infection, dental abscess, sinusitis    Patient with left maxillary sinus pressure and pain as well as significant dental caries and dental disease.  Given persistence of symptoms with concern for sinusitis, will treat with Augmentin twice daily.  Will refer to the dental clinic for close reevaluation and likely need for intervention including removal of these teeth.  Will continue Motrin and Tylenol.  Will prescribe small mount of oxycodone.  I discussed this with the patient and he is in agreement plan and has no further questions at this time.    Problems Addressed:  Acute non-recurrent maxillary sinusitis: acute illness or injury  Periodontal disease: acute illness or injury    Risk  OTC drugs.  Prescription drug management.  Parenteral controlled substances.             Medications   acetaminophen (TYLENOL) tablet 975 mg (975 mg Oral Given 12/1/24 1746)   oxyCODONE (ROXICODONE) IR tablet 5 mg (5 mg Oral Given 12/1/24 1746)   amoxicillin-clavulanate (AUGMENTIN) 875-125 mg per tablet 1 tablet (1 tablet Oral Given 12/1/24 1746)       ED Risk Strat Scores                           SBIRT 20yo+      Flowsheet Row Most Recent Value   Initial Alcohol Screen: US AUDIT-C     1. How often do you have a drink containing alcohol? 0 Filed at: 12/01/2024 1994   2. How many drinks containing alcohol do you have on a  "typical day you are drinking?  0 Filed at: 12/01/2024 1729   3a. Male UNDER 65: How often do you have five or more drinks on one occasion? 0 Filed at: 12/01/2024 1729   Audit-C Score 0 Filed at: 12/01/2024 1729   IZAIAH: How many times in the past year have you...    Used an illegal drug or used a prescription medication for non-medical reasons? Never Filed at: 12/01/2024 1729                            History of Present Illness       Chief Complaint   Patient presents with    Sinus Problem     Pt states for the past week \"I have this pressure on my face, it hurts all around my teeth.\" Pt reports taking motrin and sudafed. Denies any difficulty with speech, balance, or vision complications.        Past Medical History:   Diagnosis Date    H/O laminectomy 2020    Posterior laminectomy C3-6    Hypothyroidism       Past Surgical History:   Procedure Laterality Date    ELBOW SURGERY Right     HAND TENDON SURGERY        History reviewed. No pertinent family history.   Social History     Tobacco Use    Smoking status: Every Day     Current packs/day: 1.50     Types: Cigarettes    Smokeless tobacco: Never   Vaping Use    Vaping status: Never Used   Substance Use Topics    Alcohol use: Yes     Comment: occasionally    Drug use: Never      E-Cigarette/Vaping    E-Cigarette Use Never User       E-Cigarette/Vaping Substances    Nicotine No     THC No     CBD No     Flavoring No     Other No     Unknown No       I have reviewed and agree with the history as documented.     The patient is a 59-year-old male presenting for evaluation of left-sided sinus pressure and pain x 1 week.  The patient notes over the past 7 days having persistent sinus pressure and fullness along the left left maxillary sinus with associated left upper dental pain.  He has been taking Sudafed twice daily as well as 600 mg ibuprofen 4 times a day with slight relief of his symptoms.  However, the symptoms persist and he does feel like his pain is exceeding " what the over-the-counter dosing is able to provide.  He denies associated fevers and chills.  He denies facial swelling, ear pain, ear drainage, sore throat, difficulty swallowing, and drooling.  He does note having poor dentition at baseline but does have increased unsteady to the teeth of the left upper mouth.  He does not see a dentist regularly.      History provided by:  Patient   used: No        Review of Systems   Constitutional:  Negative for fever.   HENT:  Positive for congestion, dental problem (Left upper teeth pain), sinus pressure and sinus pain. Negative for drooling, ear discharge, ear pain, facial swelling, nosebleeds, postnasal drip, sneezing, sore throat and trouble swallowing.    All other systems reviewed and are negative.          Objective       ED Triage Vitals [12/01/24 1644]   Temperature Pulse Blood Pressure Respirations SpO2 Patient Position - Orthostatic VS   97.8 °F (36.6 °C) 74 135/88 20 99 % Sitting      Temp Source Heart Rate Source BP Location FiO2 (%) Pain Score    Oral Monitor Left arm -- 7      Vitals      Date and Time Temp Pulse SpO2 Resp BP Pain Score FACES Pain Rating User   12/01/24 1746 -- -- -- -- -- 10 - Worst Possible Pain -- AY   12/01/24 1644 97.8 °F (36.6 °C) 74 99 % 20 135/88 7 --             Physical Exam  Vitals and nursing note reviewed.   Constitutional:       General: He is not in acute distress.     Appearance: Normal appearance. He is normal weight. He is not ill-appearing or toxic-appearing.   HENT:      Head: Normocephalic and atraumatic.      Jaw: There is normal jaw occlusion. No trismus, tenderness, swelling, pain on movement or malocclusion.      Right Ear: Tympanic membrane, ear canal and external ear normal.      Left Ear: Tympanic membrane, ear canal and external ear normal.      Nose: Congestion present.      Right Turbinates: Enlarged and swollen.      Left Turbinates: Enlarged and swollen.      Right Sinus: No maxillary sinus  tenderness or frontal sinus tenderness.      Left Sinus: Maxillary sinus tenderness present. No frontal sinus tenderness.      Mouth/Throat:      Lips: Pink.      Mouth: Mucous membranes are moist.      Dentition: Abnormal dentition. Dental tenderness, gingival swelling and dental caries present. No dental abscesses or gum lesions.      Tongue: No lesions.      Palate: No lesions.      Pharynx: Oropharynx is clear. Uvula midline.     Eyes:      Extraocular Movements: Extraocular movements intact.      Conjunctiva/sclera: Conjunctivae normal.   Cardiovascular:      Rate and Rhythm: Normal rate.   Pulmonary:      Effort: Pulmonary effort is normal. No respiratory distress.   Musculoskeletal:         General: Normal range of motion.      Cervical back: Normal range of motion and neck supple.   Skin:     General: Skin is warm and dry.      Capillary Refill: Capillary refill takes less than 2 seconds.   Neurological:      General: No focal deficit present.      Mental Status: He is alert and oriented to person, place, and time. Mental status is at baseline.         Results Reviewed       None            No orders to display       Procedures    ED Medication and Procedure Management   Prior to Admission Medications   Prescriptions Last Dose Informant Patient Reported? Taking?   gabapentin (NEURONTIN) 800 mg tablet   Yes No   Sig: Take 800 mg by mouth 3 (three) times a day   ibuprofen (MOTRIN) 600 mg tablet   No No   Sig: Take 1 tablet (600 mg total) by mouth every 6 (six) hours as needed for mild pain or moderate pain   levothyroxine 125 mcg tablet   Yes No   Sig: Take 125 mcg by mouth daily   methocarbamol (ROBAXIN) 500 mg tablet   No No   Sig: Take 2 tablets (1,000 mg total) by mouth 3 (three) times a day as needed for muscle spasms      Facility-Administered Medications: None     Patient's Medications   Discharge Prescriptions    AMOXICILLIN-CLAVULANATE (AUGMENTIN) 875-125 MG PER TABLET    Take 1 tablet by mouth every  12 (twelve) hours for 7 days       Start Date: 12/1/2024 End Date: 12/8/2024       Order Dose: 1 tablet       Quantity: 14 tablet    Refills: 0    OXYCODONE (ROXICODONE) 5 IMMEDIATE RELEASE TABLET    Take 1 tablet (5 mg total) by mouth every 6 (six) hours as needed for severe pain Max Daily Amount: 20 mg       Start Date: 12/1/2024 End Date: --       Order Dose: 5 mg       Quantity: 12 tablet    Refills: 0       ED SEPSIS DOCUMENTATION   Time reflects when diagnosis was documented in both MDM as applicable and the Disposition within this note       Time User Action Codes Description Comment    12/1/2024  5:42 PM Cecile Franco [K05.6] Periodontal disease     12/1/2024  5:42 PM Cecile Franco [J01.00] Acute non-recurrent maxillary sinusitis                  GOSIA Orellana  12/01/24 3169

## 2024-12-01 NOTE — DISCHARGE INSTRUCTIONS
Take the prescribed antibiotic as directed for the full duration of its course.  Continue taking 600 mg ibuprofen (Motrin) 3 times daily with food for acute pain.  He may take this up to 4 times daily in total.  Take 1000 mg of acetaminophen (Tylenol) 3 times daily to also treat your acute pain.  Use the prescribed oxycodone as needed for severe breakthrough pain.  Follow-up with the dental clinic for reevaluation and possible need for intervention.    Return to the ER if develop fever, inability to open and close her mouth, difficulty breathing, difficulty swallowing, drooling, vomiting.

## 2024-12-04 ENCOUNTER — OFFICE VISIT (OUTPATIENT)
Dept: DENTISTRY | Facility: CLINIC | Age: 59
End: 2024-12-04

## 2024-12-04 VITALS — SYSTOLIC BLOOD PRESSURE: 123 MMHG | HEART RATE: 88 BPM | DIASTOLIC BLOOD PRESSURE: 81 MMHG

## 2024-12-04 DIAGNOSIS — Z01.20 ENCOUNTER FOR DENTAL EXAMINATION: Primary | ICD-10-CM

## 2024-12-04 PROCEDURE — D7140 EXTRACTION, ERUPTED TOOTH OR EXPOSED ROOT (ELEVATION AND/OR FORCEPS REMOVAL): HCPCS

## 2024-12-04 PROCEDURE — D0220 INTRAORAL - PERIAPICAL FIRST RADIOGRAPHIC IMAGE: HCPCS

## 2024-12-04 PROCEDURE — D0330 PANORAMIC RADIOGRAPHIC IMAGE: HCPCS

## 2024-12-04 PROCEDURE — D0140 LIMITED ORAL EVALUATION - PROBLEM FOCUSED: HCPCS

## 2024-12-04 NOTE — PROGRESS NOTES
Limited Exam    Armin Bronson 59 y.o. male presents with self to [facility] for Limited exam  PMH reviewed, no changes, ASA II. Significant medical history: reviewed. Significant allergies: reviewed. Significant medications: reviewed .    Chief complaint:  My tooth hurts and is throbbing    Consent:  Discussed that limited exam focuses on problem area, and same day tx is not guaranteed.  Patient explained to if they wish to have anything else evaluated, they need to return to the practice at which they are a patient of record or schedule a comprehensive exam afterwards.  Patient understands and consent was given by self via verbal consent and signed oral surgery informed consent.    Subjective history:    Onset: 2 weeks ago.   Provocation: Cold, Hot, Biting, Chewing, Spontaneous, Touching it.   Quality: Achy, Dull, Throbbing.   Region: Patient points to tooth #10 .   Severity: 7/10.   Timing: constant, lasts for hours.    Objective clinical findings:   Oral cancer screening: normal.   Extraoral exam: no remarkable findings.  Intraoral exam: significantly sized caries, gingival inflammation, fistula, abscess, pus, #10 .     Radiographs: PAN and Single PA - 10 .         Assessment:  #10 nonrestorable caries    Plan:   #10 EXT    Referral(s): None needed.  Rx: None.  Comprehensive care disposition:  Patient of record .    Patient dismissed ambulatory and alert.    NV: #12, 13, 15 EXT.    Attending: Dr. Cruz was present in clinic.

## 2025-08-06 ENCOUNTER — APPOINTMENT (EMERGENCY)
Dept: RADIOLOGY | Facility: HOSPITAL | Age: 60
End: 2025-08-06
Payer: COMMERCIAL

## 2025-08-06 ENCOUNTER — HOSPITAL ENCOUNTER (EMERGENCY)
Facility: HOSPITAL | Age: 60
Discharge: HOME | End: 2025-08-06
Attending: EMERGENCY MEDICINE | Admitting: EMERGENCY MEDICINE
Payer: COMMERCIAL

## 2025-08-07 ENCOUNTER — HOSPITAL ENCOUNTER (OUTPATIENT)
Dept: NON INVASIVE DIAGNOSTICS | Facility: HOSPITAL | Age: 60
Discharge: HOME | End: 2025-08-07
Attending: EMERGENCY MEDICINE
Payer: COMMERCIAL

## 2025-08-07 DIAGNOSIS — M79.671 RIGHT FOOT PAIN: ICD-10-CM

## 2025-08-07 DIAGNOSIS — M10.9 GOUT: ICD-10-CM

## 2025-08-07 PROCEDURE — 93971 EXTREMITY STUDY: CPT | Performed by: SURGERY

## 2025-08-07 PROCEDURE — 93971 EXTREMITY STUDY: CPT
